# Patient Record
Sex: FEMALE | Race: WHITE
[De-identification: names, ages, dates, MRNs, and addresses within clinical notes are randomized per-mention and may not be internally consistent; named-entity substitution may affect disease eponyms.]

---

## 2018-10-29 ENCOUNTER — HOSPITAL ENCOUNTER (EMERGENCY)
Dept: HOSPITAL 74 - JERFT | Age: 28
Discharge: HOME | End: 2018-10-29
Payer: COMMERCIAL

## 2018-10-29 VITALS — BODY MASS INDEX: 29.1 KG/M2

## 2018-10-29 VITALS — HEART RATE: 71 BPM | DIASTOLIC BLOOD PRESSURE: 67 MMHG | TEMPERATURE: 98.9 F | SYSTOLIC BLOOD PRESSURE: 112 MMHG

## 2018-10-29 DIAGNOSIS — K80.20: ICD-10-CM

## 2018-10-29 LAB
ALBUMIN SERPL-MCNC: 3.7 G/DL (ref 3.4–5)
ALP SERPL-CCNC: 155 U/L (ref 45–117)
ALT SERPL-CCNC: 64 U/L (ref 13–61)
ANION GAP SERPL CALC-SCNC: 9 MMOL/L (ref 8–16)
APPEARANCE UR: CLEAR
AST SERPL-CCNC: 62 U/L (ref 15–37)
BASOPHILS # BLD: 0.9 % (ref 0–2)
BILIRUB SERPL-MCNC: 0.6 MG/DL (ref 0.2–1)
BILIRUB UR STRIP.AUTO-MCNC: NEGATIVE MG/DL
BUN SERPL-MCNC: 8 MG/DL (ref 7–18)
CALCIUM SERPL-MCNC: 8.8 MG/DL (ref 8.5–10.1)
CHLORIDE SERPL-SCNC: 104 MMOL/L (ref 98–107)
CO2 SERPL-SCNC: 28 MMOL/L (ref 21–32)
COLOR UR: (no result)
CREAT SERPL-MCNC: 0.6 MG/DL (ref 0.55–1.3)
DEPRECATED RDW RBC AUTO: 14 % (ref 11.6–15.6)
EOSINOPHIL # BLD: 2.4 % (ref 0–4.5)
EPITH CASTS URNS QL MICRO: (no result) /HPF
GLUCOSE SERPL-MCNC: 74 MG/DL (ref 74–106)
HCT VFR BLD CALC: 39.7 % (ref 32.4–45.2)
HGB BLD-MCNC: 12.7 GM/DL (ref 10.7–15.3)
KETONES UR QL STRIP: NEGATIVE
LEUKOCYTE ESTERASE UR QL STRIP.AUTO: (no result)
LIPASE SERPL-CCNC: 242 U/L (ref 73–393)
LYMPHOCYTES # BLD: 16 % (ref 8–40)
MCH RBC QN AUTO: 29.7 PG (ref 25.7–33.7)
MCHC RBC AUTO-ENTMCNC: 32 G/DL (ref 32–36)
MCV RBC: 92.9 FL (ref 80–96)
MONOCYTES # BLD AUTO: 9.7 % (ref 3.8–10.2)
MUCOUS THREADS URNS QL MICRO: (no result)
NEUTROPHILS # BLD: 71 % (ref 42.8–82.8)
NITRITE UR QL STRIP: NEGATIVE
PH UR: 6 [PH] (ref 5–8)
PLATELET # BLD AUTO: 252 K/MM3 (ref 134–434)
PMV BLD: 9 FL (ref 7.5–11.1)
POTASSIUM SERPLBLD-SCNC: 4.7 MMOL/L (ref 3.5–5.1)
PROT SERPL-MCNC: 8 G/DL (ref 6.4–8.2)
PROT UR QL STRIP: NEGATIVE
PROT UR QL STRIP: NEGATIVE
RBC # BLD AUTO: 4.27 M/MM3 (ref 3.6–5.2)
SODIUM SERPL-SCNC: 141 MMOL/L (ref 136–145)
SP GR UR: 1.02 (ref 1.01–1.03)
UROBILINOGEN UR STRIP-MCNC: NEGATIVE MG/DL (ref 0.2–1)
WBC # BLD AUTO: 8.5 K/MM3 (ref 4–10)

## 2018-10-29 PROCEDURE — 3E033GC INTRODUCTION OF OTHER THERAPEUTIC SUBSTANCE INTO PERIPHERAL VEIN, PERCUTANEOUS APPROACH: ICD-10-PCS

## 2018-10-29 NOTE — PDOC
History of Present Illness





- General


Chief Complaint: Pain, Acute


Stated Complaint: ABD PAIN


Time Seen by Provider: 10/29/18 15:51


History Source: Patient


Exam Limitations: No Limitations





- History of Present Illness


Initial Comments: 





10/29/18 19:24


Patient is a 28-year-old female status post  1 month ago who presents 

to the emergency department for epigastric pain. Patient states that she's had 

the pain for 3 days and his gotten worse. She states that she had similar pains 

while she was pregnant. The pain came and went but was never constant. She 

states that now she is thrown up twice due to the pain. She states that the 

pain feels like a burning sensation. Denies fevers, chills, difficulty breathing

, diarrhea, constipation, frequency and urgency.





Past History





- Travel


Traveled outside of the country in the last 30 days: No


Close contact w/someone who was outside of country & ill: No





- Past Medical History


Allergies/Adverse Reactions: 


 Allergies











Allergy/AdvReac Type Severity Reaction Status Date / Time


 


No Known Allergies Allergy   Verified 10/29/18 15:46











Home Medications: 


Ambulatory Orders





Famotidine [Pepcid -] 20 mg PO DAILY #7 tablet 10/29/18 








COPD: No





- Suicide/Smoking/Psychosocial Hx


Smoking History: Never smoked


Hx Alcohol Use: No


Drug/Substance Use Hx: No





**Review of Systems





- Review of Systems


Able to Perform ROS?: Yes


Comments:: 





10/29/18 16:35


CONSTITUTIONAL: 


Absent: fever, chills, diaphoresis, generalized weakness, malaise, loss of 

appetite


HEENT: 


Absent: rhinorrhea, nasal congestion, throat pain, throat swelling, difficulty 

swallowing, mouth swelling, ear pain, eye pain, visual Changes


CARDIOVASCULAR: 


Absent: chest pain, loss of consciousness, palpitations, irregular heart rate, 

peripheral edema


RESPIRATORY: 


Absent: cough, shortness of breath, dyspnea with exertion, orthopnea, wheezing, 

stridor, hemoptysis


GASTROINTESTINAL:


Present: abdominal pain Absent: abdominal pain, abdominal distension, nausea, 

vomiting, diarrhea, constipation, melena, hematochezia


GENITOURINARY: 


Absent: dysuria, frequency, urgency, hesitancy, hematuria, flank pain, genital 

pain


MUSCULOSKELETAL: 


Absent: myalgia, arthralgia, joint swelling


SKIN: 


Absent: rash, itching, pallor


HEMATOLOGIC/IMMUNOLOGIC: 


Absent: easy bleeding, easy bruising, lymphadenopathy, frequent infections


ENDOCRINE:


Absent: unexplained weight gain, unexplained weight loss, heat intolerance, 

cold intolerance


NEUROLOGIC: 


Absent: headache, focal weakness or paresthesias, dizziness, unsteady gait, 

seizure, mental status changes, bladder or bowel incontinence


PSYCHIATRIC: 


Absent: anxiety, depression, suicidal or homicidal ideation, hallucinations.





Is the patient limited English proficient: No





*Physical Exam





- Vital Signs


 Last Vital Signs











Temp Pulse Resp BP Pulse Ox


 


 98.9 F   71   18   112/67   99 


 


 10/29/18 15:47  10/29/18 15:47  10/29/18 15:47  10/29/18 15:47  10/29/18 15:47














- Physical Exam


Comments: 





10/29/18 16:36


GENERAL:


Well developed, well nourished. Awake and alert. No acute distress.


HEENT:


Normocephalic, atraumatic. PERRLA, EOMI. No conjunctival pallor. Sclera are non-

icteric. Moist mucous membranes. Oropharynx is clear.


NECK: 


Supple. Full ROM. No JVD. Carotid pulses 2+ and symmetric, without bruits. No 

thyromegaly. No lymphadenopathy.


CARDIOVASCULAR:


Regular rate and rhythm. No murmurs, rubs, or gallops. Distal pulses are 2+ and 

symmetric. 


PULMONARY: 


No evidence of respiratory distress. Lungs clear to auscultation bilaterally. 

No wheezing, rales or rhonchi.


ABDOMINAL:


TTP of the epigastric region. No TTP of the RUQ, (-) stevens sign. Soft.  Non-

distended. No rebound or guarding. No organomegaly. Normoactive bowel sounds. 


MUSCULOSKELETAL 


Normal range of motion at all joints. No bony deformities or tenderness. No CVA 

tenderness.


EXTREMITIES: 


No cyanosis. No clubbing. No edema. No calf tenderness.


SKIN: 


Warm and dry. Normal capillary refill. No rashes. No jaundice. 


NEUROLOGICAL: 


Alert, awake, appropriate. Cranial nerves 2-12 intact. No deficits to light 

touch and temperature in face, upper extremities and lower extremities. No 

motor deficits in the in face, upper extremities and lower extremities. 

Normoreflexic in the upper and lower extremities. Normal speech. Toes are down-

going bilaterally. Gait is normal without ataxia.


PSYCHIATRIC: 


Cooperative. Good eye contact. Appropriate mood and affect.








ED Treatment Course





- LABORATORY


CBC & Chemistry Diagram: 


 10/29/18 16:50





 10/29/18 16:50





Medical Decision Making





- Medical Decision Making





10/29/18 19:27


Patient is a 28-year-old female who presented to [facility] for epigastric 

pain and tenderness for 3 days.





Given history of present illness, feels like there is some element of reflux.


GI cocktail ordered


No leukocytosis at this time. Liver enzymes doubled, however total bilirubin is 

0.6.


Ultrasound ordered from [facility]. Shows cholelithiasis without evidence of 

cholecystitis at this time.


Incidental hemangioma of the liver noted.


Patient feels better after GI cocktail. Reports pain has resolved after Reglan, 

Benadryl and Pepcid.


Pain most likely a mixture of gerd and biliary colic.


Patient would like to be discharged home. Will DC home at this time with 

surgical consult. Patient also told to follow up with her primary care doctor.





I discussed the physical exam findings, ancillary test results and final 

diagnoses with the patient. I answered all of the patient's questions. The 

patient was satisfied with the care received and felt comfortable with the 

discharge plan and treatment plan.  The Patient agrees to follow up with the 

primary care physician/specialist within 24-72 hours. Return precautions were 

given.





*DC/Admit/Observation/Transfer


Diagnosis at time of Disposition: 


 Epigastric abdominal pain





Cholelithiasis


Qualifiers:


 Cholelithiasis location: gallbladder Cholecystitis presence: without 

cholecystitis Biliary obstruction: without biliary obstruction Qualified Code(s)

: K80.20 - Calculus of gallbladder without cholecystitis without obstruction








- Discharge Dispostion


Disposition: HOME


Condition at time of disposition: Stable


Decision to Admit order: No





- Prescriptions


Prescriptions: 


Famotidine [Pepcid -] 20 mg PO DAILY #7 tablet





- Referrals


Referrals: 


Chayito Sosa [Primary Care Provider] - 


Sae Smiley MD [Staff Physician] - 





- Patient Instructions


Printed Discharge Instructions:  DI for Gallstones, DI for Gastroesophageal 

Reflux Disease (GERD)


Additional Instructions: 


Bermudez dolor es muy probablemente justice combinacin de reflujo y clculos biliares.


Se observaron clculos biliares en bermudez ultrasonido.


Por favor, soraya un seguimiento con el Dr. Smiley, consulta quirrgica para justice 

evaluacin adicional de bermudez vescula biliar.


Sofy justice dieta blanda que incluye arroz, tostadas y compota de manzana.





Regrese al departamento de emergencias por fiebre, dolor o cualquier sntoma 

nuevo o que empeore.





Your pain is most likely a combination of reflux and gallstones.


There were gallstones seen on your ultrasound


Please follow up with Dr. Smiley, surgical consult for further evaluation of 

your gallbladder.


She a bland diet including plain rice, toast and applesauce.





Return to the emergency department for fevers, pain, or any new or worsening 

symptoms.





- Post Discharge Activity


Forms/Work/School Notes:  Back to Work

## 2018-10-29 NOTE — PDOC
Rapid Medical Evaluation


Chief Complaint: Pain, Acute


Time Seen by Provider: 10/29/18 15:51


Medical Evaluation: 


 Allergies











Allergy/AdvReac Type Severity Reaction Status Date / Time


 


No Known Allergies Allergy   Verified 10/29/18 15:46








 Vital Signs











Temp Pulse Resp BP Pulse Ox


 


 98.9 F   71   18   112/67   99 


 


 10/29/18 15:47  10/29/18 15:47  10/29/18 15:47  10/29/18 15:47  10/29/18 15:47











10/29/18 15:55


I have performed a brief in-person evaluation of this patient.





The patient presents with a chief complaint of: epigastric pain for over a 

month which started during pregnancy and persistent after aa  delivery 

a month ago. report 2 episodes of vomiting yesterday. report she was told 

during pregnancy to decrease acid food intake





Pertinent physical exam findings: mild epigastric pain . no rebound or 

gaurding. normal bowel sounds





I have ordered the following: UA, UCX. abd ultrasound





The patient will proceed to the ED for further evaluation.





 





**Discharge Disposition





- Diagnosis


 Epigastric abdominal pain








- Referrals





- Patient Instructions





- Post Discharge Activity

## 2018-10-30 ENCOUNTER — HOSPITAL ENCOUNTER (INPATIENT)
Dept: HOSPITAL 74 - JER | Age: 28
LOS: 7 days | Discharge: HOME | DRG: 951 | End: 2018-11-06
Attending: INTERNAL MEDICINE | Admitting: INTERNAL MEDICINE
Payer: COMMERCIAL

## 2018-10-30 VITALS — BODY MASS INDEX: 27.3 KG/M2

## 2018-10-30 DIAGNOSIS — K85.90: ICD-10-CM

## 2018-10-30 DIAGNOSIS — R78.81: ICD-10-CM

## 2018-10-30 DIAGNOSIS — D72.829: ICD-10-CM

## 2018-10-30 DIAGNOSIS — R11.2: ICD-10-CM
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
## 2018-10-30 DIAGNOSIS — K85.10: ICD-10-CM

## 2018-10-30 DIAGNOSIS — R10.13: ICD-10-CM

## 2018-10-30 DIAGNOSIS — E87.6: ICD-10-CM

## 2018-10-30 LAB
ALBUMIN SERPL-MCNC: 3.8 G/DL (ref 3.4–5)
ALP SERPL-CCNC: 189 U/L (ref 45–117)
ALT SERPL-CCNC: 69 U/L (ref 13–61)
AMYLASE SERPL-CCNC: > 1300 U/L (ref 25–115)
ANION GAP SERPL CALC-SCNC: 9 MMOL/L (ref 8–16)
APPEARANCE UR: CLEAR
AST SERPL-CCNC: 55 U/L (ref 15–37)
BASOPHILS # BLD: 0.3 % (ref 0–2)
BILIRUB SERPL-MCNC: 1.4 MG/DL (ref 0.2–1)
BILIRUB UR STRIP.AUTO-MCNC: NEGATIVE MG/DL
BUN SERPL-MCNC: 9 MG/DL (ref 7–18)
CALCIUM SERPL-MCNC: 8.8 MG/DL (ref 8.5–10.1)
CHLORIDE SERPL-SCNC: 103 MMOL/L (ref 98–107)
CO2 SERPL-SCNC: 27 MMOL/L (ref 21–32)
COLOR UR: (no result)
CREAT SERPL-MCNC: 0.7 MG/DL (ref 0.55–1.3)
DEPRECATED RDW RBC AUTO: 14.5 % (ref 11.6–15.6)
EOSINOPHIL # BLD: 0.3 % (ref 0–4.5)
EPITH CASTS URNS QL MICRO: (no result) /HPF
GLUCOSE SERPL-MCNC: 116 MG/DL (ref 74–106)
HCT VFR BLD CALC: 41.4 % (ref 32.4–45.2)
HGB BLD-MCNC: 13.2 GM/DL (ref 10.7–15.3)
INR BLD: 1.07 (ref 0.83–1.09)
KETONES UR QL STRIP: NEGATIVE
LEUKOCYTE ESTERASE UR QL STRIP.AUTO: (no result)
LIPASE SERPL-CCNC: (no result) U/L (ref 73–393)
LIPASE SERPL-CCNC: (no result) U/L (ref 73–393)
LYMPHOCYTES # BLD: 5.9 % (ref 8–40)
MCH RBC QN AUTO: 29.7 PG (ref 25.7–33.7)
MCHC RBC AUTO-ENTMCNC: 31.8 G/DL (ref 32–36)
MCV RBC: 93.2 FL (ref 80–96)
MONOCYTES # BLD AUTO: 7.3 % (ref 3.8–10.2)
MUCOUS THREADS URNS QL MICRO: (no result)
NEUTROPHILS # BLD: 86.2 % (ref 42.8–82.8)
NITRITE UR QL STRIP: NEGATIVE
PH UR: 5 [PH] (ref 5–8)
PLATELET # BLD AUTO: 246 K/MM3 (ref 134–434)
PMV BLD: 8.7 FL (ref 7.5–11.1)
POTASSIUM SERPLBLD-SCNC: 3.4 MMOL/L (ref 3.5–5.1)
PROT SERPL-MCNC: 7.8 G/DL (ref 6.4–8.2)
PROT UR QL STRIP: NEGATIVE
PROT UR QL STRIP: NEGATIVE
PT PNL PPP: 12.6 SEC (ref 9.7–13)
RBC # BLD AUTO: 4.44 M/MM3 (ref 3.6–5.2)
SODIUM SERPL-SCNC: 139 MMOL/L (ref 136–145)
SP GR UR: 1.01 (ref 1.01–1.03)
UROBILINOGEN UR STRIP-MCNC: (no result) MG/DL (ref 0.2–1)
WBC # BLD AUTO: 17.2 K/MM3 (ref 4–10)

## 2018-10-30 RX ADMIN — POTASSIUM CHLORIDE SCH MLS/HR: 7.46 INJECTION, SOLUTION INTRAVENOUS at 22:27

## 2018-10-30 RX ADMIN — SODIUM CHLORIDE, POTASSIUM CHLORIDE, SODIUM LACTATE AND CALCIUM CHLORIDE SCH MLS/HR: 600; 310; 30; 20 INJECTION, SOLUTION INTRAVENOUS at 19:54

## 2018-10-30 RX ADMIN — AMPICILLIN SODIUM AND SULBACTAM SODIUM SCH MLS/HR: 2; 1 INJECTION, POWDER, FOR SOLUTION INTRAMUSCULAR; INTRAVENOUS at 21:05

## 2018-10-30 NOTE — PN
Teaching Attending Note


Name of Resident: Ashley Cash





ATTENDING PHYSICIAN STATEMENT





I saw and evaluated the patient.


I reviewed the resident's note and discussed the case with the resident.


I agree with the resident's findings and plan as documented.








SUBJECTIVE:


Seen and examined; she is a 27 y/o HF presenting to the ER for the second day 

in a row for upper abdominal pain.  Please see the resident's note for further 

historical information.  In summation, she has no PMH and is  and takes no 

chronic medications.  She is hemodynamically stable and afebrile but does have 

an elevated WBC count.  She came back today because the pain was worse.  US GB 

today shows sludge in the GB with CBD 5cm and recommended MRCP; the patient's 

AST/ALT, Alk Phos, and Total Bili are elevated today which is new from 

yesterday.  Lipase >30k.  Admitting to the medicine service for acute 

pancreatitis likely due to biliary etiology and consulting GI and Surgery





10 sys ROS done and negative aside from HPI





Denies EtOH, tobacco abuse.  From [place].


FH asked and noncontributory


PMH and PSH per chart





OBJECTIVE:


VSS and imaging reviewed


NAD, resting in bed, AAO


Tender in upper abd; ND, +BS


RRR S1/2 no mgr


Lungs CTAB with sym exp


Trachea midline without any JVD


NC AT EOMI PERRLA


Mood normal, behavior appropriate


CN 2-12 wnl, no FND





ASSESSMENT AND PLAN:


1) Acute Pancreatitis


-Patient presents with elevated LFTs and Lipase >30k; suspicious for acute 

pancreatitis from biliary source.  US results noted; suspecting biliary etiology


-Consulting GI and Surgery


-MRCP ordered for AM


-Trend CMP, monitor clinically.  If she decompensates tonight can check CT but 

likely can rely on MRCP for imaging.  Check TB as from endemic area, check 

lipids.  Monitor blood cultures.


-As elevated WBC will monitor fevers and empirically cover with abx


-LR @150cc/hr; monitor Is and Os.





2) Transaminitis





Full Code

## 2018-10-30 NOTE — CONSULT
Consult


Consult Specialty:: General Surgery


Referred by:: Madina Horne


Reason for Consultation:: gallstone pancreatitis





- History of Present Illness


Chief Complaint: epigastric pain, n/v


History of Present Illness: 





29yo [place] F, 1 month postpartum from uncomplicated  1 week past 

due date, presented to ER with epigastric pain radiating to back associated 

with multiple episodes of N/V. She was seen in ER yesterday for similar symptoms

, but today was much worse, both in pain and vomiting. She also experienced 

similar symptoms about 5 months ago during pregnancy. Yesterday, labs were 

normal except for mildly elevated LFTs with normal bili, and US showed 

gallstones with distended gallbladder and enlarged cbd at 6mm. She felt better 

after GI cocktail and reglan, and wanted to go home; she was discharged with 

reglan prn, which she only took one of at home. Today, she had worse pain and 

more vomiting after eating and returned to ER. Labs now significant for wbc 17, 

bili 1.4, LFTs still double normal, lipase >30,000. US repeated showing similar 

findings, stones and sludge in gallbladder, cbd 5mm. Surgery is asked to assess.





She is seen and examined in ER holding area,  at bedside. He speaks 

English and assisted with history at her request. She indicated her pain is 

better but still present and points to epigastric area. She is breastfeeding 

her son, but understands she will have to pump and dump for now. IV fluids and 

antibiotics have been started, at GI's request, who will see her as well. Last 

normal BM was yesterday. Pregnancy and c/s delivery were uncomplicated. She is 

only taking prenatal vitamins at home. 





- History Source


History Provided By: Family Member (), Medical Record


Limitations to Obtaining History: Language Barrier ([place] -  

facilitated history and translation at bedside at pt's request)





- Past Medical History


Hepatobiliary: Yes: Cholelithiasis


...Pregnant: No (1 month postpartum)





- Past Surgical History


Past Surgical History: Yes:  (18)





- Alcohol/Substance Use


Hx Alcohol Use: No


History of Substance Use: reports: None





- Smoking History


Smoking history: Never smoked





- Social History


Usual Living Arrangement: With Spouse


ADL: Independent


Occupation: not employed


Place of Birth: [place] ([place]


Came to [place] (year[place] 


History of Recent Travel: No





Home Medications





- Allergies


Allergies/Adverse Reactions: 


 Allergies











Allergy/AdvReac Type Severity Reaction Status Date / Time


 


No Known Allergies Allergy   Verified 10/30/18 16:22














- Home Medications


Home Medications: 


Ambulatory Orders





Metoclopramide HCl [Reglan] 10 mg PO TID 10/30/18 


Pnv No.95/Ferrous Fum/Folic AC [Prenatal Multivitamin Tablet] 1 each PO DAILY 10

/30/18 











Family Disease History





- Family Disease History


Family Disease History: Diabetes: Father


Other Family History: cousin had gallbladder out





Review of Systems





- Review of Systems


Constitutional: denies: Chills, Fever


Eyes: reports: Other (reading glasses).  denies: Recent Change in Vision


HENT: denies: Difficult Swallowing, Throat Pain


Neck: denies: Swollen Glands, Tenderness


Cardiovascular: denies: Chest Pain, Palpitations


Respiratory: denies: Cough, SOB


Gastrointestinal: reports: Abdominal Pain (with hpi), Nausea (with hpi), 

Vomiting (with hpi).  denies: Constipation, Diarrhea


Genitourinary: denies: Burning, Dysuria


Musculoskeletal: reports: Back Pain (with hpi (through from epigastrium)).  

denies: Joint Pain, Muscle Pain


Integumentary: denies: Change in Color, Rash


Neurological: denies: Dizziness, Headache


Psychiatric: denies: Anxiety, Depression





Physical Exam


Vital Signs: 


 Vital Signs











Temperature  97.2 F L  10/30/18 16:22


 


Pulse Rate  97 H  10/30/18 16:22


 


Respiratory Rate  18   10/30/18 16:22


 


Blood Pressure  116/81   10/30/18 16:22


 


O2 Sat by Pulse Oximetry (%)  100   10/30/18 16:22











Constitutional: Yes: Well Nourished, No Distress, Calm


Eyes: Yes: Conjunctiva Clear, EOM Intact.  No: Sclera Icterus


HENT: Yes: Atraumatic, Normocephalic


Neck: Yes: Supple, Trachea Midline


Cardiovascular: Yes: Regular Rate and Rhythm


Respiratory: Yes: Regular, CTA Bilaterally


Gastrointestinal: Yes: Soft, Hypoactive Bowel Sounds, Tenderness (epigastric 

and RUQ), Tenderness, Epigastrium.  No: Distention, Tenderness, Rebound


...Rectal Exam: Yes: Deferred


Renal/: No: CVA Tenderness - Left, CVA Tenderness - Right


Musculoskeletal: No: Joint Stiffness, Joint Swelling


Extremities: No: Cool, Cyanosis


Edema: No


Peripheral Pulses WNL: Yes


Integumentary: Yes: Incision (Pfannenstiel, healed).  No: Jaundice, Rash


Wound/Incision: Yes: Clean/Dry, Well Approximated, Open to air


Neurological: Yes: Alert, Oriented


Psychiatric: Yes: Alert, Oriented


Labs: 


 CBC, BMP





 10/30/18 16:53 





 10/30/18 16:53 





 CMP











Sodium  139 mmol/L (136-145)   10/30/18  16:53    


 


Potassium  3.4 mmol/L (3.5-5.1)  L  10/30/18  16:53    


 


Chloride  103 mmol/L ()   10/30/18  16:53    


 


Carbon Dioxide  27 mmol/L (21-32)   10/30/18  16:53    


 


Anion Gap  9 MMOL/L (8-16)   10/30/18  16:53    


 


BUN  9 mg/dL (7-18)   10/30/18  16:53    


 


Creatinine  0.7 mg/dL (0.55-1.3)   10/30/18  16:53    


 


Creat Clearance w eGFR  > 60  (>60)   10/30/18  16:53    


 


Random Glucose  116 mg/dL ()  H  10/30/18  16:53    


 


Calcium  8.8 mg/dL (8.5-10.1)   10/30/18  16:53    


 


Total Bilirubin  1.4 mg/dL (0.2-1)  H  10/30/18  16:53    


 


AST  55 U/L (15-37)  H  10/30/18  16:53    


 


ALT  69 U/L (13-61)  H  10/30/18  16:53    


 


Alkaline Phosphatase  189 U/L ()  H  10/30/18  16:53    


 


Total Protein  7.8 g/dl (6.4-8.2)   10/30/18  16:53    


 


Albumin  3.8 g/dl (3.4-5.0)   10/30/18  16:53    


 


Lipase  > 99126 U/L ()  H  10/30/18  16:44    


 


Beta HCG, Quant  < 1.0 mIU/ml  10/30/18  16:44    








wbc up from 8.5 yesterday


bili up from 0.6 yesterday


LFTs otherwise about the same


lipase 242 yesterday


 INR, PTT











INR  1.07  (0.83-1.09)   10/30/18  16:53    








 Urine Test Results











Urine Color  Dkyellow   10/30/18  16:53    


 


Urine Appearance  Clear   10/30/18  16:53    


 


Urine pH  5.0  (5.0-8.0)   10/30/18  16:53    


 


Ur Specific Gravity  1.011  (1.010-1.035)   10/30/18  16:53    


 


Urine Protein  Negative  (NEGATIVE)   10/30/18  16:53    


 


Urine Glucose (UA)  Negative  (NEGATIVE)   10/30/18  16:53    


 


Urine Ketones  Negative  (NEGATIVE)   10/30/18  16:53    


 


Urine Blood  2+  (NEGATIVE)  H  10/30/18  16:53    


 


Urine Nitrite  Negative  (NEGATIVE)   10/30/18  16:53    


 


Urine Bilirubin  Negative  (<2.0 mg/dL)   10/30/18  16:53    


 


Ur Leukocyte Esterase  Trace  (NEGATIVE)   10/30/18  16:53    


 


Ur Epithelial Cells  Rare /HPF (FEW)   10/30/18  16:53    


 


Urine Mucus  Rare   10/30/18  16:53    














Imaging





- Results


Ultrasound: Report Reviewed, Image Reviewed (images reviewed from yesterday and 

today - multiple small gallstones, some sludge, distended gb, dilated cbd/chd 

at 5-6mm (pt age 28), no wall thickening or pericholecystic fluid)


MRI: Pending





Problem List





- Problems


(1) Acute biliary pancreatitis without infection or necrosis


Assessment/Plan: 


admitted to medicine


NPO until pain/tenderness resolve


generous IV hydration


MRCP pending


GI consultation for possible ERCP


trend labs including amylase, lipase


GI requested IV antibiotics, Unasyn and Flagyl


pain meds prn - nonnarcotics first line, morphine breakthrough only





discussed possible cholecystectomy with pt and , but timing and approach 

will have to be considered further pending above and given recent open surgery 

with healing scar





discussed with Dr. Whalen of medical team


Code(s): K85.10 - BILIARY ACUTE PANCREATITIS WITHOUT NECROSIS OR INFECTION   





(2) Calculus of gallbladder and bile duct w/o cholecystitis or obstruction


Code(s): K80.70 - CALCULUS OF GB AND BILE DUCT W/O CHOLECYST W/O OBSTRUCTION   





(3) Disease of digestive system complicating puerperium


Assessment/Plan: 


1 month postpartum


breastfeeding - will need to pump & dump until 48 hours after last 

contraindicated medication intake





Code(s): O99.63 - DISEASES OF THE DIGESTIVE SYSTEM COMPLICATING THE PUERPERIUM 

  





(4) Epigastric abdominal pain


Code(s): R10.13 - EPIGASTRIC PAIN   





(5) Nausea and vomiting


Code(s): R11.2 - NAUSEA WITH VOMITING, UNSPECIFIED   


Qualifiers: 


   Vomiting type: unspecified   Vomiting Intractability: non-intractable   

Qualified Code(s): R11.2 - Nausea with vomiting, unspecified

## 2018-10-30 NOTE — HP
CHIEF COMPLAINT: abdominal pain with vomiting 





PCP: does not have one 








HISTORY OF PRESENT ILLNESS:


28 year old female from [place] with no PMHx presented to the 

hospital with 2 days history of med abdominal pain 10/10 radiating to her back, 

worsen with food improved with pain killer in ED , associated with vomiting x4 

of food particles. she presented to [facility] Ed yesterday with the same complaint 

and was sent home with metochlopramid and Ranitine prescription. pt denies any 

headache, fever, chills, N/D/C, denies any chest pain , palpitation or sob , 

she denies any urinary symptoms, joint pain or swelling in her feet. 





Pt gave a birth last month with C section , she had similiar symptoms during 

pregancy . 











ER course was notable for:


(1)Cbc, CMP 


(2)Abdomen US 


(3)Morphine , IV fluids 





Recent Travel:[place] , came from [place] 10 months ago.





PAST MEDICAL HISTORY:denies 





PAST SURGICAL HISTORY:


Skin lumb removal on left flank , C section X 1 


Social History:


Smoking:denies 


Alcohol:denies 


Drugs: denies 





Family History:Father with DM 


Allergies





No Known Allergies Allergy (Verified 10/30/18 16:22)


 








HOME MEDICATIONS:


 Home Medications











 Medication  Instructions  Recorded


 


Metoclopramide HCl [Reglan] 10 mg PO TID 10/30/18








REVIEW OF SYSTEMS


CONSTITUTIONAL: 


Absent:  fever, chills, diaphoresis, generalized weakness, malaise, loss of 

appetite, weight change


HEENT: 


Absent:  rhinorrhea, nasal congestion, throat pain, throat swelling, difficulty 

swallowing, mouth swelling, ear pain, eye pain, visual changes


CARDIOVASCULAR: 


Absent: chest pain, syncope, palpitations, irregular heart rate, lightheadedness

, peripheral edema


RESPIRATORY: 


Absent: cough, shortness of breath, dyspnea with exertion, orthopnea, wheezing, 

stridor, hemoptysis


GASTROINTESTINAL:


Absent: abdominal pain, abdominal distension, nausea, vomiting, diarrhea, 

constipation, melena, hematochezia


GENITOURINARY: 


Absent: dysuria, frequency, urgency, hesitancy, hematuria, flank pain, genital 

pain


MUSCULOSKELETAL: 


Absent: myalgia, arthralgia, joint swelling, back pain, neck pain


SKIN: 


Absent: rash, itching, pallor


HEMATOLOGIC/IMMUNOLOGIC: 


Absent: easy bleeding, easy bruising, lymphadenopathy, frequent infections


ENDOCRINE:


Absent: unexplained weight gain, unexplained weight loss, heat intolerance, 

cold intolerance


NEUROLOGIC: 


Absent: headache, focal weakness or paresthesias, dizziness, unsteady gait, 

seizure, mental status changes, bladder or bowel incontinence


PSYCHIATRIC: 


Absent: anxiety, depression, suicidal or homicidal ideation, hallucinations.








PHYSICAL EXAMINATION


 Vital Signs - 24 hr











  10/30/18





  16:22


 


Temperature 97.2 F L


 


Pulse Rate 97 H


 


Respiratory 18





Rate 


 


Blood Pressure 116/81


 


O2 Sat by Pulse 100





Oximetry (%) 








GENERAL: AAOx3 in moderate distress 


HEAD: NC/AT


EYES: EOMI, Conjunctiva clear, sclera anicteric


ENT: moist mucous membrane 


NECK: Supple, no JVD


LUNGS: CTA B/L, no crackles no wheezing no accessory muscle use.


HEART: RRR, NSR, normal s1, s2, murmur no M/R/G


ABDOMEN: Soft, ND, mid epigastric and med abdominal tenderness , +BS 4 Q, left  

CVA Tenderness.Grant + 


LOWER EXTREMITIES: no edema, +2DP pulse,


NEUROLOGICAL:  No focal deficit. Normal speech. gait not observed.


PSYCHIATRIC: Cooperative. Good eye contact. Appropriate mood and affect.


SKIN: Warm, dry,





 Laboratory Results - last 24 hr











  10/30/18 10/30/18 10/30/18





  16:44 16:53 16:53


 


WBC   17.2 H 


 


RBC   4.44 


 


Hgb   13.2 


 


Hct   41.4 


 


MCV   93.2 


 


MCH   29.7 


 


MCHC   31.8 L 


 


RDW   14.5 


 


Plt Count   246 


 


MPV   8.7 


 


Absolute Neuts (auto)   14.8 H 


 


Neutrophils %   86.2 H D 


 


Lymphocytes %   5.9 L D 


 


Monocytes %   7.3 


 


Eosinophils %   0.3  D 


 


Basophils %   0.3 


 


Nucleated RBC %   0 


 


PT with INR    12.60


 


INR    1.07


 


Sodium   


 


Potassium   


 


Chloride   


 


Carbon Dioxide   


 


Anion Gap   


 


BUN   


 


Creatinine   


 


Creat Clearance w eGFR   


 


Random Glucose   


 


Calcium   


 


Total Bilirubin   


 


AST   


 


ALT   


 


Alkaline Phosphatase   


 


Total Protein   


 


Albumin   


 


Lipase  > 26223 H  


 


Beta HCG, Quant  < 1.0  


 


Urine Color   


 


Urine Appearance   


 


Urine pH   


 


Ur Specific Gravity   


 


Urine Protein   


 


Urine Glucose (UA)   


 


Urine Ketones   


 


Urine Blood   


 


Urine Nitrite   


 


Urine Bilirubin   


 


Urine Urobilinogen   


 


Ur Leukocyte Esterase   


 


Urine WBC (Auto)   


 


Urine RBC (Auto)   


 


Ur Epithelial Cells   


 


Urine Mucus   


 


Urine HCG, Qual   














  10/30/18 10/30/18





  16:53 16:53


 


WBC  


 


RBC  


 


Hgb  


 


Hct  


 


MCV  


 


MCH  


 


MCHC  


 


RDW  


 


Plt Count  


 


MPV  


 


Absolute Neuts (auto)  


 


Neutrophils %  


 


Lymphocytes %  


 


Monocytes %  


 


Eosinophils %  


 


Basophils %  


 


Nucleated RBC %  


 


PT with INR  


 


INR  


 


Sodium   139


 


Potassium   3.4 L


 


Chloride   103


 


Carbon Dioxide   27


 


Anion Gap   9


 


BUN   9


 


Creatinine   0.7


 


Creat Clearance w eGFR   > 60


 


Random Glucose   116 H


 


Calcium   8.8


 


Total Bilirubin   1.4 H


 


AST   55 H


 


ALT   69 H


 


Alkaline Phosphatase   189 H


 


Total Protein   7.8


 


Albumin   3.8


 


Lipase  


 


Beta HCG, Quant  


 


Urine Color  Dkyellow 


 


Urine Appearance  Clear 


 


Urine pH  5.0 


 


Ur Specific [place]  1.011 


 


Urine Protein  Negative 


 


Urine Glucose (UA)  Negative 


 


Urine Ketones  Negative 


 


Urine Blood  2+ H 


 


Urine Nitrite  Negative 


 


Urine Bilirubin  Negative 


 


Urine Urobilinogen  4.0 e.u/dl H 


 


Ur Leukocyte Esterase  Trace 


 


Urine WBC (Auto)  4 


 


Urine RBC (Auto)  14 


 


Ur Epithelial Cells  Rare 


 


Urine Mucus  Rare 


 


Urine HCG, Qual  Positive 








 CBC, BMP





 10/30/18 16:53 





 10/30/18 16:53 








ASSESSMENT/PLAN:


28 year old female azalea speaking returned  to the ED with 2 days  history of 

abdominal pain and vomiting was found to have pancreatitis gallstones will be 

admitted to med-surg for further evaluation 





# Acute abdominal pain due to pancreatitis gall stones 


* Abdominal pain 7-9/10 , with 4 times vomiting 


* Lipase 30, 000, WBC 17.000, Bili 1.4 total , AST 55, ALT 69


* NPO 


* IV fluids 


* pain control with morphine Q 4 hr 


* GI consult for possible ERCP 


* PT, PTT , INR 


* cbc , cmp in AM , Mg , P 


* Bilirubin total and direct 


* Amylase and lipase in AM 


*  abx 


* Blood cx ,UA  urine cx, cxR


# FEN 


* F: NS @ 125 CC/hr 


* E: hypokalemia , replinished as needed , repeat BMP in AM 


* N: NPO till ERCP 


# Proph : pneumatic compression 





# Dispo: admit to med surg 


# Full code 








Visit type





- Emergency Visit


Emergency Visit: Yes


ED Registration Date: 10/30/18


Care time: The patient presented to the Emergency Department on the above date 

and was hospitalized for further evaluation of their emergent condition.





- New Patient


This patient is new to me today: Yes


Date on this admission: 10/30/18





- Critical Care


Critical Care patient: No

## 2018-10-30 NOTE — PDOC
History of Present Illness





- General


Chief Complaint: Pain


Stated Complaint: ABD PAIN


Time Seen by Provider: 10/30/18 16:23





- History of Present Illness


Initial Comments: 


Flo Lay is a 27yo woman 1 mo postpartum (uncomplicated c-

section) who was seen in the ED yesterday with epigastric pain. she re-presents 

today with worsening pain. 





Yesterday, she reported burning epigastric pain that would come and go, present 

for 3 days, 2 episodes of emesis. She had similar pain when pregnant. She had 

an ultrasound showing gallstones and gallbladder distension but no wall 

thickening or pericholecystic fluid. Her LFTs were very mildly elevated, and 

remainder of her labs were not concerning. She felt better after receiving a GI 

cocktail and was discharged home with suspected GERD and biliary colic.





Today, Ms Whitmore was initially feeling well in the morning. She had cereal 

for breakfast without any issues. After rice at lunch, she started to have "11/

10" non-radiating epigastric pain and several episodes of emesis. She took some 

of the reglan prescribed yesterday without relief. She has had 4 episodes of 

vomiting total, and her pain has continued to be severe. She is unable to 

describe the pain, saying that "it just hurts." She has not had any fevers over 

the past day. 


10/30/18 17:44








Past History





- Past Medical History


Allergies/Adverse Reactions: 


 Allergies











Allergy/AdvReac Type Severity Reaction Status Date / Time


 


No Known Allergies Allergy   Verified 10/30/18 16:22











Home Medications: 


Ambulatory Orders





Metoclopramide HCl [Reglan] 10 mg PO TID 10/30/18 








COPD: No





- Immunization History


Immunization Up to Date: Yes





- Suicide/Smoking/Psychosocial Hx


Smoking History: Never smoked


Hx Alcohol Use: No


Drug/Substance Use Hx: No





**Review of Systems





- Review of Systems


Comments:: 


General: No fevers, no chills, no weight or appetite change, no malaise


HEENT: No changes in vision, no changes in hearing, no congestion, no sore 

throat


CV: No chest pain, no palpitations, no LE edema


Pulm: No SOB, no cough, no wheezing


GI: See HPI. No change in bowel habits


: No frequency, no urgency, no dysuria


Musc: No back pain, no joint swelling, no recent injury


Skin: No rash, no lesions, no erythema


Endo: No excessive thirst, no heat/cold intolerance


Heme: No unusual bruising or bleeding, no swollen glands


Neuro: No syncope, no numbness/tingling, no focal weakness


Vasc: No claudication


Psych: No recent change in mood, no SI or HI








*Physical Exam





- Vital Signs


 Last Vital Signs











Temp Pulse Resp BP Pulse Ox


 


 97.2 F L  97 H  18   116/81   100 


 


 10/30/18 16:22  10/30/18 16:22  10/30/18 16:22  10/30/18 16:22  10/30/18 16:22














- Physical Exam


Comments: 


General: Uncomfortable, in no acute distress


HEENT: PERRL, EOMI, MMM, voice normal, normal neck ROM


Cards: RRR, no murmur appreciated


Pulm: Comfortable on room air, clear to auscultation bilaterally


Abd: Soft, nondistended. Moderate epigastric tenderness. Negative Grant's 

sign. 


: No CVA tenderness


Ext: Atraumatic. No LE edema. ROM intact. Strength 5/5 and equal bilaterally


Vasc: Extremities WWP. 


Skin: Normal color, no rashes or lesions


Neuro: A&Ox3, CN grossly intact, normal speech, motor/sensory grossly intact 

and symmetric


Psych: Mood appropriate to situation











ED Treatment Course





- LABORATORY


CBC & Chemistry Diagram: 


 10/30/18 16:53





 10/30/18 16:53





- ADDITIONAL ORDERS


Additional order review: 


 Laboratory  Results











  10/30/18





  16:53


 


Urine HCG, Qual  Positive








 











  10/30/18





  16:53


 


RBC  4.44


 


MCV  93.2


 


MCHC  31.8 L


 


RDW  14.5


 


MPV  8.7


 


Neutrophils %  86.2 H D


 


Lymphocytes %  5.9 L D


 


Monocytes %  7.3


 


Eosinophils %  0.3  D


 


Basophils %  0.3














- Medications


Given in the ED: 


ED Medications














Discontinued Medications














Generic Name Dose Route Start Last Admin





  Trade Name Freq  PRN Reason Stop Dose Admin


 


Acetaminophen  1,000 mg  10/30/18 16:26  10/30/18 17:06





  Ofirmev Injection -  IVPB  10/30/18 16:27  1,000 mg





  ONCE ONE   Administration





     





     





     





     


 


Famotidine/Sodium Chloride  20 mg in 50 mls @ 100 mls/hr  10/30/18 16:27  10/30/

18 17:06





  Pepcid 20 Mg Premixed Ivpb -  IVPB  10/30/18 16:56  100 mls/hr





  ONCE ONE   Administration





     





     





     





     


 


Ondansetron HCl  4 mg  10/30/18 16:27  10/30/18 17:06





  Zofran Injection  IVPUSH  10/30/18 16:28  4 mg





  ONCE ONE   Administration





     





     





     





     














Medical Decision Making





- Medical Decision Making





10/30/18 17:45


Flo Lay is an otherwise healthy 27yo woman 1 month post c-

section who presents to the ED for worsening epigastric pain and emesis. She 

was seen in the ED yesterday and found to have gallstones without 

cholelithiasis and suspected GERD. 


- Given worsening of symptoms, concern for cholecystitis


- CBC, CMP, lipase, UA, urine preg, repeat US ordered in E


- Urine preg positive, was negative yesterday - bHCG added


- WBC returned at 17 from 8 yesterday. Remainder of labs pending





10/30/18 17:58


- Labs notable for WBC 17.2 from 8.5, tbili 1.4 from 0.6, lipase >07674 from 

242. Urine with 2+ blood, urobilinogen 4.0 from negative, 4 wbc, 14 rbc's. 


- Patient at ultrasound


- Suspect gallstone pancreatitis


- Need to verify PMD. Will admit with GI and surgery consults





10/30/18 19:06


- Admit to hospitalist service. Waiting for call back for sign out


- Spoke to Dr Soni. Requested LR@150/hr, blood cultures, unasyn and flagyl, 

MRCP order. Per request also placed orders for AM LFTs, direct bili, CRP, 

amylase and lipase for 6am. 





10/30/18 19:28


- Spoke to Dr Odom. Sending repeat amylase and lipase now. Requests NPO now, IV 

tylenol for pain rather than morphine.


- Microblog sent to medicine team with recommendation updates from Dr Soni 

and Dr Odom.








Seen and discussed with Dr Guardado.





Madina Horne


PGY1











*DC/Admit/Observation/Transfer


Diagnosis at time of Disposition: 


 Epigastric abdominal pain








- Referrals





- Patient Instructions





- Post Discharge Activity

## 2018-10-30 NOTE — PDOC
Rapid Medical Evaluation


Chief Complaint: Pain


Time Seen by Provider: 10/30/18 16:23


Medical Evaluation: 


 Allergies











Allergy/AdvReac Type Severity Reaction Status Date / Time


 


No Known Allergies Allergy   Verified 10/30/18 16:22











10/30/18 16:23


Pt presents to the ED for epigastric pain and tenderness. Was evaluated 

yesterday and found to have gall stones, but no cholecysitis. Worsening pain 

today. States the pain came when eating


Exam: pt appears uncomfortable. epigastric tenderness on exam


Orders: Labs, urine, IV, US


Pt to proceed to ED for further evaluation





**Discharge Disposition





- Diagnosis


 Epigastric abdominal pain








- Referrals





- Patient Instructions





- Post Discharge Activity

## 2018-10-30 NOTE — PDOC
Attending Attestation





- HPI


HPI: 





10/30/18 17:46


The patient is a 28-year-old female, with no past medical history, s/p 1 month 

postpartum, who presents to the ED with upper abdominal pain. The patient was 

seen in the ED yesterday and had an US that revealed gallstones. Patient was 

discharged with the diagnosis of GERD. Patient developed the pain today after 

eating rice. 





The patient denies any fevers, chills, nausea, vomiting, or diarrhea. 





Allergies: NKA








- Physicial Exam


PE: 


10/30/18 17:47


GENERAL: Awake, alert, and fully oriented, in no acute distress


HEAD: No signs of trauma


EYES: PERRLA, EOMI, sclera anicteric, conjunctiva clear


ENT: Auricles normal inspection, hearing grossly normal, nares patent, 

oropharynx clear without exudates. Moist mucosa


NECK: Normal ROM, supple, no lymphadenopathy, JVD, or masses


LUNGS: Breath sounds equal, clear to auscultation bilaterally.  No wheezes, and 

no crackles


HEART: Regular rate and rhythm, normal S1 and S2, no murmurs, rubs or gallops


ABDOMEN: (+)Tenderness to the epigastric region, mild tenderness to palpation 

of the RUQ. Grant's sign negative. Soft, normoactive bowel sounds.  No guarding

, no rebound.  No masses


EXTREMITIES: Normal range of motion, no edema.  No clubbing or cyanosis. No 

cords, erythema, or tenderness


NEUROLOGICAL: Cranial nerves II through XII grossly intact.  Normal speech, 

normal gait


SKIN: Warm, Dry, normal turgor, no rashes or lesions noted.








<Pat Carranza - Last Filed: 10/30/18 17:49>





- Resident


Resident Name: Madina Horne





- ED Attending Attestation


I have performed the following: I have examined & evaluated the patient, The 

case was reviewed & discussed with the resident, I agree w/resident's findings 

& plan, Exceptions are as noted





- Medical Decision Making





10/30/18 17:35





A portion of this note was documented by scribe services under my direction. I 

have reviewed the details of the note, within reason, and agree with the 

documentation with the following case summary and management plan written by 

me. 





Patient treated in the ED.





Nursing notes are reviewed and incorporated into the medical decision-making.


Vital signs reviewed.





Peripheral IV access obtained by the nurse, laboratory studies are drawn and 

sent, reviewed and interpreted by myself. 





 Vital Signs











Temp Pulse Resp BP Pulse Ox


 


 97.2 F L  97 H  18   116/81   100 


 


 10/30/18 16:22  10/30/18 16:22  10/30/18 16:22  10/30/18 16:22  10/30/18 16:22








28-year-old female patient with no past medical history, status post 1 month 

postpartum via  presents with persistent upper abdominal pain. Patient 

was seen here yesterday had ultrasound performed which demonstrated gallstones. 

White count was unremarkable the patient was sent home with diagnoses of GERD. 

Today, the patient ate rice and had recurrence of pain. No fevers or chills.





Patient has upper abdominal pain. I'm concerned for acute cholecystitis. White 

count is 17. We'll obtain a repeat ultrasound and if equivocal, we'll admit for 

MRCP. Will likely need empiric antibiotics and surgical consult and admission 

to the hospital.


10/30/18 17:55





 CBC, BMP





 10/30/18 16:53 





 10/30/18 16:53 





 CMP











Sodium  139 mmol/L (136-145)   10/30/18  16:53    


 


Potassium  3.4 mmol/L (3.5-5.1)  L  10/30/18  16:53    


 


Chloride  103 mmol/L ()   10/30/18  16:53    


 


Carbon Dioxide  27 mmol/L (21-32)   10/30/18  16:53    


 


Anion Gap  9 MMOL/L (8-16)   10/30/18  16:53    


 


BUN  9 mg/dL (7-18)   10/30/18  16:53    


 


Creatinine  0.7 mg/dL (0.55-1.3)   10/30/18  16:53    


 


Creat Clearance w eGFR  > 60  (>60)   10/30/18  16:53    


 


Random Glucose  116 mg/dL ()  H  10/30/18  16:53    


 


Calcium  8.8 mg/dL (8.5-10.1)   10/30/18  16:53    


 


Total Bilirubin  1.4 mg/dL (0.2-1)  H  10/30/18  16:53    


 


AST  55 U/L (15-37)  H  10/30/18  16:53    


 


ALT  69 U/L (13-61)  H  10/30/18  16:53    


 


Alkaline Phosphatase  189 U/L ()  H  10/30/18  16:53    


 


Total Protein  7.8 g/dl (6.4-8.2)   10/30/18  16:53    


 


Albumin  3.8 g/dl (3.4-5.0)   10/30/18  16:53    


 


Lipase  > 55525 U/L ()  H  10/30/18  16:44    


 


Beta HCG, Quant  < 1.0 mIU/ml  10/30/18  16:44    








Lipase > 73109.


I'm concerned for gallstone pancreatitis.


GI and surgery consult.


Admit.





<Stephen Guardado - Last Filed: 10/30/18 18:00>





**Heart Score/ECG Review


  ** #1


ECG reviewed & interpreted by me at: 17:35





10/30/18 17:40


NSR 75, no std/juan, T wave flat III, normal axis, normal intervals,  msec





<Stephen Guardado - Last Filed: 10/30/18 18:00>





Attestations





- Attestations





10/30/18 17:49





Documentation prepared by Pat Carranza, acting as medical scribe for Stephen Guardado MD.





<Pat Carranza - Last Filed: 10/30/18 17:49>

## 2018-10-31 LAB
ALBUMIN SERPL-MCNC: 3.3 G/DL (ref 3.4–5)
ALP SERPL-CCNC: 145 U/L (ref 45–117)
ALT SERPL-CCNC: 48 U/L (ref 13–61)
AMYLASE SERPL-CCNC: > 1300 U/L (ref 25–115)
ANION GAP SERPL CALC-SCNC: 8 MMOL/L (ref 8–16)
APTT BLD: 32.4 SECONDS (ref 25.2–36.5)
AST SERPL-CCNC: 27 U/L (ref 15–37)
BASOPHILS # BLD: 0.1 % (ref 0–2)
BILIRUB CONJ SERPL-MCNC: 0.2 MG/DL (ref 0–0.2)
BILIRUB SERPL-MCNC: 0.6 MG/DL (ref 0.2–1)
BUN SERPL-MCNC: 6 MG/DL (ref 7–18)
CALCIUM SERPL-MCNC: 8.7 MG/DL (ref 8.5–10.1)
CHLORIDE SERPL-SCNC: 104 MMOL/L (ref 98–107)
CHOLEST SERPL-MCNC: 151 MG/DL (ref 50–200)
CO2 SERPL-SCNC: 25 MMOL/L (ref 21–32)
CREAT SERPL-MCNC: 0.6 MG/DL (ref 0.55–1.3)
DEPRECATED RDW RBC AUTO: 14.1 % (ref 11.6–15.6)
EOSINOPHIL # BLD: 0.1 % (ref 0–4.5)
GLUCOSE SERPL-MCNC: 85 MG/DL (ref 74–106)
HCT VFR BLD CALC: 38.5 % (ref 32.4–45.2)
HDLC SERPL-MCNC: 43 MG/DL (ref 40–60)
HGB BLD-MCNC: 12.2 GM/DL (ref 10.7–15.3)
INR BLD: 1.21 (ref 0.83–1.09)
LYMPHOCYTES # BLD: 9.3 % (ref 8–40)
MAGNESIUM SERPL-MCNC: 2 MG/DL (ref 1.8–2.4)
MCH RBC QN AUTO: 29.2 PG (ref 25.7–33.7)
MCHC RBC AUTO-ENTMCNC: 31.6 G/DL (ref 32–36)
MCV RBC: 92.3 FL (ref 80–96)
MONOCYTES # BLD AUTO: 8.9 % (ref 3.8–10.2)
NEUTROPHILS # BLD: 81.6 % (ref 42.8–82.8)
PHOSPHATE SERPL-MCNC: 3.6 MG/DL (ref 2.5–4.9)
PLATELET # BLD AUTO: 226 K/MM3 (ref 134–434)
PMV BLD: 8.9 FL (ref 7.5–11.1)
POTASSIUM SERPLBLD-SCNC: 3.8 MMOL/L (ref 3.5–5.1)
PROT SERPL-MCNC: 6.9 G/DL (ref 6.4–8.2)
PT PNL PPP: 14.3 SEC (ref 9.7–13)
RBC # BLD AUTO: 4.17 M/MM3 (ref 3.6–5.2)
SODIUM SERPL-SCNC: 138 MMOL/L (ref 136–145)
TRIGL SERPL-MCNC: 70 MG/DL (ref 0–150)
WBC # BLD AUTO: 11.3 K/MM3 (ref 4–10)

## 2018-10-31 RX ADMIN — AMPICILLIN SODIUM AND SULBACTAM SODIUM SCH MLS/HR: 2; 1 INJECTION, POWDER, FOR SOLUTION INTRAMUSCULAR; INTRAVENOUS at 17:59

## 2018-10-31 RX ADMIN — MORPHINE SULFATE PRN MG: 2 INJECTION, SOLUTION INTRAMUSCULAR; INTRAVENOUS at 09:58

## 2018-10-31 RX ADMIN — AMPICILLIN SODIUM AND SULBACTAM SODIUM SCH MLS/HR: 2; 1 INJECTION, POWDER, FOR SOLUTION INTRAMUSCULAR; INTRAVENOUS at 09:57

## 2018-10-31 RX ADMIN — AMPICILLIN SODIUM AND SULBACTAM SODIUM SCH MLS/HR: 2; 1 INJECTION, POWDER, FOR SOLUTION INTRAMUSCULAR; INTRAVENOUS at 02:32

## 2018-10-31 RX ADMIN — ACETAMINOPHEN PRN MG: 10 INJECTION, SOLUTION INTRAVENOUS at 17:58

## 2018-10-31 RX ADMIN — POTASSIUM CHLORIDE SCH MLS/HR: 7.46 INJECTION, SOLUTION INTRAVENOUS at 00:19

## 2018-10-31 RX ADMIN — PIPERACILLIN AND TAZOBACTAM SCH MLS/HR: 4; .5 INJECTION, POWDER, LYOPHILIZED, FOR SOLUTION INTRAVENOUS at 19:54

## 2018-10-31 RX ADMIN — SODIUM CHLORIDE, POTASSIUM CHLORIDE, SODIUM LACTATE AND CALCIUM CHLORIDE SCH MLS/HR: 600; 310; 30; 20 INJECTION, SOLUTION INTRAVENOUS at 14:46

## 2018-10-31 RX ADMIN — SODIUM CHLORIDE, POTASSIUM CHLORIDE, SODIUM LACTATE AND CALCIUM CHLORIDE SCH MLS/HR: 600; 310; 30; 20 INJECTION, SOLUTION INTRAVENOUS at 06:41

## 2018-10-31 RX ADMIN — MORPHINE SULFATE PRN MG: 2 INJECTION, SOLUTION INTRAMUSCULAR; INTRAVENOUS at 03:37

## 2018-10-31 NOTE — PN
Physical Exam: 


SUBJECTIVE: Patient seen and examined. No acute events overnight. Pt. speaks 

Cypriot and was interpeted by  via phone.  states that we can 

call him at any time for assistance translating, did not confirm with Pt. alone 

if this was acceptable to her. Pt. endorses increased pain on deep respiration. 

Pt. denies N/V/D/C. Pt. denies chest pain, abdominal pain or shortness of 

breath at this time. 








OBJECTIVE:





 Vital Signs











 Period  Temp  Pulse  Resp  BP Sys/Bob  Pulse Ox


 


 Last 24 Hr  97.2 F-99.7 F  75-98  17-20  112-123/68-86  











GENERAL: The patient is awake, alert, and fully oriented, in no acute distress.


HEAD: Normal with no signs of trauma.


EYES: PERRL, sclera anicteric, conjunctiva clear. No ptosis. 


ENT: Ears normal, nares patent, oropharynx clear without exudates, moist mucous 

membranes.


NECK: Trachea midline, full range of motion, supple. 


LUNGS: Decreased breath sounds d/t pain, clear to auscultation bilaterally, no 

wheezes, no crackles, no accessory muscle use. 


HEART: Regular rate and rhythm, S1, S2 without murmur


ABDOMEN: Soft, nontender, nondistended, normoactive bowel sounds, no guarding, 

no rebound- did not assess deep palpation


EXTREMITIES: 2+ dorsal pedal pulses, warm, well-perfused, no edema. 


NEUROLOGICAL: Normal speech, gait not observed.


PSYCH: Normal mood, normal affect.


SKIN: Warm, dry, normal turgor, vertical surgical scar in abdomen.














 Laboratory Results - last 24 hr











  10/30/18 10/30/18 10/30/18





  16:44 16:53 16:53


 


WBC   17.2 H 


 


RBC   4.44 


 


Hgb   13.2 


 


Hct   41.4 


 


MCV   93.2 


 


MCH   29.7 


 


MCHC   31.8 L 


 


RDW   14.5 


 


Plt Count   246 


 


MPV   8.7 


 


Absolute Neuts (auto)   14.8 H 


 


Neutrophils %   86.2 H D 


 


Lymphocytes %   5.9 L D 


 


Monocytes %   7.3 


 


Eosinophils %   0.3  D 


 


Basophils %   0.3 


 


Nucleated RBC %   0 


 


PT with INR    12.60


 


INR    1.07


 


PTT (Actin FS)   


 


Sodium   


 


Potassium   


 


Chloride   


 


Carbon Dioxide   


 


Anion Gap   


 


BUN   


 


Creatinine   


 


Creat Clearance w eGFR   


 


Random Glucose   


 


Calcium   


 


Phosphorus   


 


Magnesium   


 


Total Bilirubin   


 


Direct Bilirubin   


 


AST   


 


ALT   


 


Alkaline Phosphatase   


 


C-Reactive Protein   


 


Total Protein   


 


Albumin   


 


Triglycerides   


 


Cholesterol   


 


Total LDL Cholesterol   


 


HDL Cholesterol   


 


Total Amylase   


 


Lipase  > 31741 H  


 


Beta HCG, Quant  < 1.0  


 


Urine Color   


 


Urine Appearance   


 


Urine pH   


 


Ur Specific Gravity   


 


Urine Protein   


 


Urine Glucose (UA)   


 


Urine Ketones   


 


Urine Blood   


 


Urine Nitrite   


 


Urine Bilirubin   


 


Urine Urobilinogen   


 


Ur Leukocyte Esterase   


 


Urine WBC (Auto)   


 


Urine RBC (Auto)   


 


Ur Epithelial Cells   


 


Urine Mucus   


 


Urine HCG, Qual   


 


Blood Type   


 


Antibody Screen   














  10/30/18 10/30/18 10/30/18





  16:53 16:53 21:04


 


WBC   


 


RBC   


 


Hgb   


 


Hct   


 


MCV   


 


MCH   


 


MCHC   


 


RDW   


 


Plt Count   


 


MPV   


 


Absolute Neuts (auto)   


 


Neutrophils %   


 


Lymphocytes %   


 


Monocytes %   


 


Eosinophils %   


 


Basophils %   


 


Nucleated RBC %   


 


PT with INR   


 


INR   


 


PTT (Actin FS)   


 


Sodium   139 


 


Potassium   3.4 L 


 


Chloride   103 


 


Carbon Dioxide   27 


 


Anion Gap   9 


 


BUN   9 


 


Creatinine   0.7 


 


Creat Clearance w eGFR   > 60 


 


Random Glucose   116 H 


 


Calcium   8.8 


 


Phosphorus   


 


Magnesium   


 


Total Bilirubin   1.4 H 


 


Direct Bilirubin   


 


AST   55 H 


 


ALT   69 H 


 


Alkaline Phosphatase   189 H 


 


C-Reactive Protein   


 


Total Protein   7.8 


 


Albumin   3.8 


 


Triglycerides   


 


Cholesterol   


 


Total LDL Cholesterol   


 


HDL Cholesterol   


 


Total Amylase    > 1300 H


 


Lipase    96436 H


 


Beta HCG, Quant   


 


Urine Color  Dkyellow  


 


Urine Appearance  Clear  


 


Urine pH  5.0  


 


Ur Specific [place]  1.011  


 


Urine Protein  Negative  


 


Urine Glucose (UA)  Negative  


 


Urine Ketones  Negative  


 


Urine Blood  2+ H  


 


Urine Nitrite  Negative  


 


Urine Bilirubin  Negative  


 


Urine Urobilinogen  4.0 e.u/dl H  


 


Ur Leukocyte Esterase  Trace  


 


Urine WBC (Auto)  4  


 


Urine RBC (Auto)  14  


 


Ur Epithelial Cells  Rare  


 


Urine Mucus  Rare  


 


Urine HCG, Qual  Positive  


 


Blood Type   


 


Antibody Screen   














  10/31/18 10/31/18 10/31/18





  06:30 06:30 06:30


 


WBC   11.3 H 


 


RBC   4.17 


 


Hgb   12.2 


 


Hct   38.5 


 


MCV   92.3 


 


MCH   29.2 


 


MCHC   31.6 L 


 


RDW   14.1 


 


Plt Count   226 


 


MPV   8.9 


 


Absolute Neuts (auto)   9.2 H 


 


Neutrophils %   81.6 


 


Lymphocytes %   9.3  D 


 


Monocytes %   8.9 


 


Eosinophils %   0.1 


 


Basophils %   0.1 


 


Nucleated RBC %   0 


 


PT with INR    14.30 H


 


INR    1.21 H


 


PTT (Actin FS)    32.4


 


Sodium   


 


Potassium   


 


Chloride   


 


Carbon Dioxide   


 


Anion Gap   


 


BUN   


 


Creatinine   


 


Creat Clearance w eGFR   


 


Random Glucose   


 


Calcium   


 


Phosphorus   


 


Magnesium   


 


Total Bilirubin   


 


Direct Bilirubin   


 


AST   


 


ALT   


 


Alkaline Phosphatase   


 


C-Reactive Protein   


 


Total Protein   


 


Albumin   


 


Triglycerides   


 


Cholesterol   


 


Total LDL Cholesterol   


 


HDL Cholesterol   


 


Total Amylase   


 


Lipase  9958 H  


 


Beta HCG, Quant   


 


Urine Color   


 


Urine Appearance   


 


Urine pH   


 


Ur Specific Gravity   


 


Urine Protein   


 


Urine Glucose (UA)   


 


Urine Ketones   


 


Urine Blood   


 


Urine Nitrite   


 


Urine Bilirubin   


 


Urine Urobilinogen   


 


Ur Leukocyte Esterase   


 


Urine WBC (Auto)   


 


Urine RBC (Auto)   


 


Ur Epithelial Cells   


 


Urine Mucus   


 


Urine HCG, Qual   


 


Blood Type   


 


Antibody Screen   














  10/31/18 10/31/18 10/31/18





  06:30 06:30 06:30


 


WBC   


 


RBC   


 


Hgb   


 


Hct   


 


MCV   


 


MCH   


 


MCHC   


 


RDW   


 


Plt Count   


 


MPV   


 


Absolute Neuts (auto)   


 


Neutrophils %   


 


Lymphocytes %   


 


Monocytes %   


 


Eosinophils %   


 


Basophils %   


 


Nucleated RBC %   


 


PT with INR   


 


INR   


 


PTT (Actin FS)   


 


Sodium  138  


 


Potassium  3.8  


 


Chloride  104  


 


Carbon Dioxide  25  


 


Anion Gap  8  


 


BUN  6 L  


 


Creatinine  0.6  


 


Creat Clearance w eGFR  > 60  


 


Random Glucose  85  


 


Calcium  8.7  


 


Phosphorus  3.6  


 


Magnesium  2.0  


 


Total Bilirubin  0.6  


 


Direct Bilirubin    0.2


 


AST  27  


 


ALT  48  


 


Alkaline Phosphatase  145 H  


 


C-Reactive Protein    5.7 H


 


Total Protein  6.9  


 


Albumin  3.3 L  


 


Triglycerides   70 


 


Cholesterol   151 


 


Total LDL Cholesterol   97 


 


HDL Cholesterol   43 


 


Total Amylase    > 1300 H


 


Lipase   


 


Beta HCG, Quant   


 


Urine Color   


 


Urine Appearance   


 


Urine pH   


 


Ur Specific Gravity   


 


Urine Protein   


 


Urine Glucose (UA)   


 


Urine Ketones   


 


Urine Blood   


 


Urine Nitrite   


 


Urine Bilirubin   


 


Urine Urobilinogen   


 


Ur Leukocyte Esterase   


 


Urine WBC (Auto)   


 


Urine RBC (Auto)   


 


Ur Epithelial Cells   


 


Urine Mucus   


 


Urine HCG, Qual   


 


Blood Type   


 


Antibody Screen   














  10/31/18





  06:30


 


WBC 


 


RBC 


 


Hgb 


 


Hct 


 


MCV 


 


MCH 


 


MCHC 


 


RDW 


 


Plt Count 


 


MPV 


 


Absolute Neuts (auto) 


 


Neutrophils % 


 


Lymphocytes % 


 


Monocytes % 


 


Eosinophils % 


 


Basophils % 


 


Nucleated RBC % 


 


PT with INR 


 


INR 


 


PTT (Actin FS) 


 


Sodium 


 


Potassium 


 


Chloride 


 


Carbon Dioxide 


 


Anion Gap 


 


BUN 


 


Creatinine 


 


Creat Clearance w eGFR 


 


Random Glucose 


 


Calcium 


 


Phosphorus 


 


Magnesium 


 


Total Bilirubin 


 


Direct Bilirubin 


 


AST 


 


ALT 


 


Alkaline Phosphatase 


 


C-Reactive Protein 


 


Total Protein 


 


Albumin 


 


Triglycerides 


 


Cholesterol 


 


Total LDL Cholesterol 


 


HDL Cholesterol 


 


Total Amylase 


 


Lipase 


 


Beta HCG, Quant 


 


Urine Color 


 


Urine Appearance 


 


Urine pH 


 


Ur Specific Gravity 


 


Urine Protein 


 


Urine Glucose (UA) 


 


Urine Ketones 


 


Urine Blood 


 


Urine Nitrite 


 


Urine Bilirubin 


 


Urine Urobilinogen 


 


Ur Leukocyte Esterase 


 


Urine WBC (Auto) 


 


Urine RBC (Auto) 


 


Ur Epithelial Cells 


 


Urine Mucus 


 


Urine HCG, Qual 


 


Blood Type  O POSITIVE


 


Antibody Screen  Negative








Active Medications





 Current Medications





Acetaminophen (Ofirmev Injection -)  1,000 mg IVPB Q6H PRN


   PRN Reason: Pain Level 4 - 10


Ampicillin Sodium/Sulbactam (Sodium 3 gm/ Sodium Chloride)  100 mls @ 200 mls/

hr IVPB Q8H-IV ASHKAN


   Last Admin: 10/31/18 09:57 Dose:  200 mls/hr


Metronidazole (Flagyl 500mg Premixed Ivpb -)  500 mg in 100 mls @ 100 mls/hr 

IVPB Q8H-IV ASHKAN


   Last Admin: 10/31/18 11:18 Dose:  100 mls/hr


Lactated Ringer's (Lactated Ringers Solution)  1,000 mls @ 150 mls/hr IV ASDIR 

ASHKAN


   Last Admin: 10/31/18 06:41 Dose:  150 mls/hr


Morphine Sulfate (Morphine Sulfate)  2 mg IVPUSH Q4H PRN


   PRN Reason: Pain Level 7-10 BREAKTHROUGH


   Stop: 11/02/18 19:44


   Last Admin: 10/31/18 09:58 Dose:  2 mg


Ondansetron HCl (Zofran Injection)  4 mg IVPUSH Q6H PRN


   PRN Reason: NAUSEA





 Home Medications











 Medication  Instructions  Recorded


 


Metoclopramide HCl [Reglan] 10 mg PO TID 10/30/18


 


Pnv No.95/Ferrous Fum/Folic AC 1 each PO DAILY 10/30/18





[Prenatal Multivitamin Tablet]  








MRCP:


Abd US: inspissated bile sludge in gallbladder lumen, multiple non-obstructing 

stones seen in GB, CBD 0.5cm. 





ASSESSMENT/PLAN:


A 28 y. o. Cypriot speaking F returns to the ED with 2 days Hx. of abdominal 

pain and vomiting, admitted for pancreatitis 2/2 biliary sludge.  





#Gastroenterology


-Acute abdominal pain 2/2 acute pancreatitis 2/2 inspissated biliary sludge 


Abdominal pain 7-9/10 , with 4 times vomiting on admission. No vomiting since


f/u MRCP read


Lipase 30,000-->18,735-->9,958, WBC 17k-->11.3k; c/w trend


Bili 1.4 total, slightly elevated LFTs


c/w NPO 


c/w IVF


Pain control with morphine Q4H


Surgery Consult (Dr. Odom) appreciated 


GI consult(Dr. Soni) appreciated for possible ERCP, also recommend Pump and 

Dump for 1 month is taking medications contraindicated for breast feeding, and 

morphine should be used for breakthrough pain only. Non-narcotics (Tylenol) for 

pain should be used first. 


Bilirubin total and direct 


C/w Unasyn and Flagyl


f/u BCx., UCx. 


UA- 


-Nausea-resolved


c/w Zofran 4mg PRN


failed outpatient management with Reglan





#Infectious Disease


-Bacteremia


BCx. Positive for gram - bacilli


c/w Unasyn





#FEN 


-LR @ 150ml/hr 


-monitor electrolytes, replete if needed 


-NPO till ERCP 





#Ppx.


DVT. 


-SCDs











Visit type





- Emergency Visit


Emergency Visit: Yes


ED Registration Date: 10/30/18


Care time: The patient presented to the Emergency Department on the above date 

and was hospitalized for further evaluation of their emergent condition.





- New Patient


This patient is new to me today: Yes


Date on this admission: 10/31/18





- Critical Care


Critical Care patient: No





- Discharge Referral


Referred to [facility] No

## 2018-10-31 NOTE — CON.GI
Consult


Consult Specialty:: Gastroenterology ( covering Dr Briscoe)


Referred by:: Madina Navas MD


Reason for Consultation:: Pancreatitis





- History of Present Illness


Chief Complaint: Abdominal pain


History of Present Illness: 





28F developed severe upper abdominal pain that radiates into her back and was 

associated with nausea and vomiting. The history was obtained using [employer] 

 Idis # [id number]. The patient is about 4 weeks postpartum and a C 

section. She was seen in the ER and discharged but had to return when the 

pancreatitis developed.  She has gallstones. 





- History Source


History Provided By: Patient


Limitations to Obtaining History: Language Barrier





- Past Medical History


Hepatobiliary: Yes: Cholelithiasis


...Pregnant: No (1 month postpartum)





- Past Surgical History


Past Surgical History: Yes:  (18)





- Alcohol/Substance Use


Hx Alcohol Use: No


History of Substance Use: reports: None





- Smoking History


Smoking history: Never smoked


Have you smoked in the past 12 months: No





- Social History


Usual Living Arrangement: With Spouse


ADL: Independent


Occupation: not employed


Place of Birth: Other (born in [facility] [place]. came to [place] 25)


History of Recent Travel: No





Home Medications





- Allergies


Allergies/Adverse Reactions: 


 Allergies











Allergy/AdvReac Type Severity Reaction Status Date / Time


 


No Known Allergies Allergy   Verified 10/30/18 16:22














- Home Medications


Home Medications: 


Ambulatory Orders





Metoclopramide HCl [Reglan] 10 mg PO TID 10/30/18 


Pnv No.95/Ferrous Fum/Folic AC [Prenatal Multivitamin Tablet] 1 each PO DAILY 10

/30/18 











Family Disease History





- Family Disease History


Family Disease History: Diabetes: Father


Other Family History: cousin had gallbladder out.  GM had vaginal cancer





Review of Systems





- Review of Systems


Constitutional: reports: No Symptoms


Eyes: reports: No Symptoms


HENT: reports: No Symptoms


Neck: reports: No Symptoms


Cardiovascular: reports: No Symptoms


Respiratory: reports: No Symptoms


Gastrointestinal: reports: Abdominal Pain, Vomiting


Musculoskeletal: reports: No Symptoms





Physical Exam-GI


Vital Signs: 


 Vital Signs











Temperature  99.4 F   10/31/18 08:50


 


Pulse Rate  88   10/31/18 08:50


 


Respiratory Rate  19   10/31/18 08:50


 


Blood Pressure  123/75   10/31/18 08:50


 


O2 Sat by Pulse Oximetry (%)  100   10/30/18 22:36








CBC,CMP











WBC  11.3 K/mm3 (4.0-10.0)  H  10/31/18  06:30    


 


RBC  4.17 M/mm3 (3.60-5.2)   10/31/18  06:30    


 


Hgb  12.2 GM/dL (10.7-15.3)   10/31/18  06:30    


 


Hct  38.5 % (32.4-45.2)   10/31/18  06:30    


 


MCV  92.3 fl (80-96)   10/31/18  06:30    


 


MCH  29.2 pg (25.7-33.7)   10/31/18  06:30    


 


MCHC  31.6 g/dl (32.0-36.0)  L  10/31/18  06:30    


 


RDW  14.1 % (11.6-15.6)   10/31/18  06:30    


 


Plt Count  226 K/MM3 (134-434)   10/31/18  06:30    


 


MPV  8.9 fl (7.5-11.1)   10/31/18  06:30    


 


Absolute Neuts (auto)  9.2 K/mm3 (1.5-8.0)  H  10/31/18  06:30    


 


Neutrophils %  81.6 % (42.8-82.8)   10/31/18  06:30    


 


Lymphocytes %  9.3 % (8-40)  D 10/31/18  06:30    


 


Monocytes %  8.9 % (3.8-10.2)   10/31/18  06:30    


 


Eosinophils %  0.1 % (0-4.5)   10/31/18  06:30    


 


Basophils %  0.1 % (0-2.0)   10/31/18  06:30    


 


Nucleated RBC %  0 % (0-0)   10/31/18  06:30    


 


Sodium  138 mmol/L (136-145)   10/31/18  06:30    


 


Potassium  3.8 mmol/L (3.5-5.1)   10/31/18  06:30    


 


Chloride  104 mmol/L ()   10/31/18  06:30    


 


Carbon Dioxide  25 mmol/L (21-32)   10/31/18  06:30    


 


Anion Gap  8 MMOL/L (8-16)   10/31/18  06:30    


 


BUN  6 mg/dL (7-18)  L  10/31/18  06:30    


 


Creatinine  0.6 mg/dL (0.55-1.3)   10/31/18  06:30    


 


Creat Clearance w eGFR  > 60  (>60)   10/31/18  06:30    


 


Random Glucose  85 mg/dL ()   10/31/18  06:30    


 


Calcium  8.7 mg/dL (8.5-10.1)   10/31/18  06:30    


 


Phosphorus  3.6 mg/dL (2.5-4.9)   10/31/18  06:30    


 


Magnesium  2.0 mg/dL (1.8-2.4)   10/31/18  06:30    


 


Total Bilirubin  0.6 mg/dL (0.2-1)   10/31/18  06:30    


 


Direct Bilirubin  0.2 mg/dL (0.0-0.2)   10/31/18  06:30    


 


AST  27 U/L (15-37)   10/31/18  06:30    


 


ALT  48 U/L (13-61)   10/31/18  06:30    


 


Alkaline Phosphatase  145 U/L ()  H  10/31/18  06:30    


 


C-Reactive Protein  5.7 MG/DL (0.00-0.3)  H  10/31/18  06:30    


 


Total Protein  6.9 g/dl (6.4-8.2)   10/31/18  06:30    


 


Albumin  3.3 g/dl (3.4-5.0)  L  10/31/18  06:30    


 


Triglycerides  70 mg/dL (0-150)   10/31/18  06:30    


 


Cholesterol  151 mg/dL ()   10/31/18  06:30    


 


Total LDL Cholesterol  97 mg/dL (5-100)   10/31/18  06:30    


 


HDL Cholesterol  43 mg/dL (40-60)   10/31/18  06:30    


 


Total Amylase  > 1300 U/L ()  H  10/31/18  06:30    


 


Lipase  9958 U/L ()  H  10/31/18  06:30    


 


Beta HCG, Quant  < 1.0 mIU/ml  10/30/18  16:44    








 Current Medications











Generic Name Dose Route Start Last Admin





  Trade Name Freq  PRN Reason Stop Dose Admin


 


Acetaminophen  1,000 mg  10/30/18 21:31  





  Ofirmev Injection -  IVPB   





  Q6H PRN   





  Pain Level 4 - 10   





     





     





     


 


Ampicillin Sodium/Sulbactam  100 mls @ 200 mls/hr  10/30/18 19:15  10/31/18 09:

57





  Sodium 3 gm/ Sodium Chloride  IVPB   200 mls/hr





  Q8H-IV ASHKAN   Administration





     





     





     





     


 


Metronidazole  500 mg in 100 mls @ 100 mls/hr  10/30/18 19:15  10/31/18 11:18





  Flagyl 500mg Premixed Ivpb -  IVPB   100 mls/hr





  Q8H-IV ASHKAN   Administration





     





     





     





     


 


Lactated Ringer's  1,000 mls @ 150 mls/hr  10/30/18 19:45  10/31/18 06:41





  Lactated Ringers Solution  IV   150 mls/hr





  ASDIR ASHKAN   Administration





     





     





     





     


 


Morphine Sulfate  2 mg  10/30/18 21:32  10/31/18 09:58





  Morphine Sulfate  IVPUSH  18 19:44  2 mg





  Q4H PRN   Administration





  Pain Level 7-10 BREAKTHROUGH   





     





     





     


 


Ondansetron HCl  4 mg  10/30/18 19:42  





  Zofran Injection  IVPUSH   





  Q6H PRN   





  NAUSEA   





     





     





     











Constitutional: Yes: Well Nourished


Eyes: Yes: Conjunctiva Clear


HENT: Yes: Normocephalic


Neck: Yes: Supple


Cardiovascular: Yes: Regular Rate and Rhythm


Respiratory: Yes: CTA Bilaterally


Gastrointestinal Inspection: Yes: Scars (healed Pfannensteil incision)


...Auscultate: Yes: Hypoactive Bowel Sounds


...Palpate: Yes: Soft, Tenderness (epigastric tenderness but no peritoneal signs

)


...Rectal Exam: Yes: Deferred


Labs: 


 CBC, BMP





 10/31/18 06:30 





 10/31/18 06:30 





 INR, PTT











INR  1.21  (0.83-1.09)  H  10/31/18  06:30    














Imaging





- Results


MRI: Image Reviewed (the CBD appears clear but await radiology reading)





Assessment/Plan





I believe that Flo has biliary pancreatitis with a passed CBD stone given 

her LFTs pattern.


Using the [employer]  I discussed the potential need for ERCP and 

sphincteormtomy to extract CBD stones. I have informed her of the potential for 

such complications as perforation, hemorrhage and pancreatitis leading to 

multiorgan failure and possible transfusions and emergent surgery. She has 

granted a verbal consent should ERCP prove necessary


I have explained the need for cholecystectomy to prevent repeated biliary 

pancreatittis. cholangitis and cholecystitis. Dr Odom is already on the case. 


I will decrease her IV fluid rate


Continue antibiotics


Await MRCP

## 2018-10-31 NOTE — EKG
Test Reason : 

Blood Pressure : ***/*** mmHG

Vent. Rate : 075 BPM     Atrial Rate : 075 BPM

   P-R Int : 150 ms          QRS Dur : 080 ms

    QT Int : 406 ms       P-R-T Axes : 063 -06 026 degrees

   QTc Int : 453 ms

 

NORMAL SINUS RHYTHM

POSSIBLE LEFT ATRIAL ENLARGEMENT

BORDERLINE ECG

NO PREVIOUS ECGS AVAILABLE

Confirmed by ORLANDO FLOWERS, CHEYENNE ([id number]) on 10/31/2018 10:57:11 AM

 

Referred By:             Confirmed By:CHEYENNE PERRY MD

## 2018-10-31 NOTE — PN
Teaching Attending Note


Name of Resident: Jan Lynne





ATTENDING PHYSICIAN STATEMENT





I saw and evaluated the patient.


I reviewed the resident's note and discussed the case with the resident.


I agree with the resident's findings and plan as documented.








SUBJECTIVE:


No fever ro chills, feeels better  this afternoon. still with some abd pain , 

no n/v . 








OBJECTIVE:


NAD 


Cv : RRR


Lungs: CTAB 


ext : rochelle jacque 


Abd: sfot, ND, TTP in RUQ. and epigastric area 








ASSESSMENT AND PLAN:





29 y/o lady with no significant PMH who presented with abd pain and was found 

to have acute pancreatitis and acute cholecystitis 





1- Acute gall stone pancreatitis and cholecystitis . 


MRCP reviewed. Blood cx + G- organism


- change Abx to zosyn and consult ID 


- repeat blood cx tomorrow 


- appreciate GI and sx input 


- timing of CCY


- NPO 


- IVF and pain control 


 


HLOC

## 2018-11-01 LAB
ACANTHOCYTES BLD QL SMEAR: 0
ALBUMIN SERPL-MCNC: 3 G/DL (ref 3.4–5)
ALP SERPL-CCNC: 117 U/L (ref 45–117)
ALT SERPL-CCNC: 33 U/L (ref 13–61)
ANION GAP SERPL CALC-SCNC: 9 MMOL/L (ref 8–16)
ANISOCYTOSIS BLD QL: 0
AST SERPL-CCNC: 16 U/L (ref 15–37)
BASO STIPL BLD QL SMEAR: 0
BASOPHILS # BLD: 0.6 % (ref 0–2)
BILIRUB SERPL-MCNC: 0.7 MG/DL (ref 0.2–1)
BUN SERPL-MCNC: 4 MG/DL (ref 7–18)
CALCIUM SERPL-MCNC: 8.4 MG/DL (ref 8.5–10.1)
CHLORIDE SERPL-SCNC: 103 MMOL/L (ref 98–107)
CO2 SERPL-SCNC: 26 MMOL/L (ref 21–32)
CREAT SERPL-MCNC: 0.5 MG/DL (ref 0.55–1.3)
DACRYOCYTES BLD QL SMEAR: 0
DEPRECATED RDW RBC AUTO: 14.4 % (ref 11.6–15.6)
DOHLE BOD BLD QL SMEAR: 0
EOSINOPHIL # BLD: 0.7 % (ref 0–4.5)
GLUCOSE SERPL-MCNC: 102 MG/DL (ref 74–106)
HCT VFR BLD CALC: 37.2 % (ref 32.4–45.2)
HELMET CELLS BLD QL SMEAR: 0
HGB BLD-MCNC: 11.7 GM/DL (ref 10.7–15.3)
HOWELL-JOLLY BOD BLD QL SMEAR: 0
LIPASE SERPL-CCNC: 1487 U/L (ref 73–393)
LYMPHOCYTES # BLD: 6.7 % (ref 8–40)
MACROCYTES BLD QL: 0
MAGNESIUM SERPL-MCNC: 1.9 MG/DL (ref 1.8–2.4)
MCH RBC QN AUTO: 29 PG (ref 25.7–33.7)
MCHC RBC AUTO-ENTMCNC: 31.5 G/DL (ref 32–36)
MCV RBC: 91.9 FL (ref 80–96)
MONOCYTES # BLD AUTO: 8.9 % (ref 3.8–10.2)
NEUTROPHILS # BLD: 83.1 % (ref 42.8–82.8)
OVALOCYTES BLD QL SMEAR: 0
PHOSPHATE SERPL-MCNC: 3.2 MG/DL (ref 2.5–4.9)
PLATELET # BLD AUTO: 211 K/MM3 (ref 134–434)
PLATELET BLD QL SMEAR: NORMAL
PMV BLD: 8.5 FL (ref 7.5–11.1)
POTASSIUM SERPLBLD-SCNC: 3.7 MMOL/L (ref 3.5–5.1)
PROT SERPL-MCNC: 6.5 G/DL (ref 6.4–8.2)
RBC # BLD AUTO: 4.05 M/MM3 (ref 3.6–5.2)
ROULEAUX BLD QL SMEAR: 0
SICKLE CELLS BLD QL SMEAR: 0
SODIUM SERPL-SCNC: 138 MMOL/L (ref 136–145)
TARGETS BLD QL SMEAR: 0
TOXIC GRANULES BLD QL SMEAR: 0
WBC # BLD AUTO: 15.6 K/MM3 (ref 4–10)

## 2018-11-01 RX ADMIN — MORPHINE SULFATE PRN MG: 2 INJECTION, SOLUTION INTRAMUSCULAR; INTRAVENOUS at 14:11

## 2018-11-01 RX ADMIN — PIPERACILLIN AND TAZOBACTAM SCH MLS/HR: 4; .5 INJECTION, POWDER, LYOPHILIZED, FOR SOLUTION INTRAVENOUS at 14:15

## 2018-11-01 RX ADMIN — PIPERACILLIN AND TAZOBACTAM SCH MLS/HR: 4; .5 INJECTION, POWDER, LYOPHILIZED, FOR SOLUTION INTRAVENOUS at 01:39

## 2018-11-01 RX ADMIN — PIPERACILLIN AND TAZOBACTAM SCH MLS/HR: 4; .5 INJECTION, POWDER, LYOPHILIZED, FOR SOLUTION INTRAVENOUS at 10:22

## 2018-11-01 RX ADMIN — SODIUM CHLORIDE, POTASSIUM CHLORIDE, SODIUM LACTATE AND CALCIUM CHLORIDE SCH: 600; 310; 30; 20 INJECTION, SOLUTION INTRAVENOUS at 14:14

## 2018-11-01 RX ADMIN — ACETAMINOPHEN PRN MG: 10 INJECTION, SOLUTION INTRAVENOUS at 00:26

## 2018-11-01 RX ADMIN — SODIUM CHLORIDE, POTASSIUM CHLORIDE, SODIUM LACTATE AND CALCIUM CHLORIDE SCH MLS/HR: 600; 310; 30; 20 INJECTION, SOLUTION INTRAVENOUS at 17:20

## 2018-11-01 RX ADMIN — PIPERACILLIN AND TAZOBACTAM SCH MLS/HR: 4; .5 INJECTION, POWDER, LYOPHILIZED, FOR SOLUTION INTRAVENOUS at 21:09

## 2018-11-01 RX ADMIN — ACETAMINOPHEN PRN MG: 10 INJECTION, SOLUTION INTRAVENOUS at 14:33

## 2018-11-01 NOTE — CON.ID
Consult


Consult Specialty:: infectious diseases


Referred by:: 


Reason for Consultation:: bacteremia





- History of Present Illness


Chief Complaint: abd pain


History of Present Illness: 





28F with severe upper abdominal pain that radiates into her back which was 

associated with nausea and vomiting.


patient is post partum about 4 weeks she had a uncomplicated c section


patient initially came to the emergency room it seems and was discharged but 

came back because she was having abd pain and was found to have pancreatitis


patient was worked in up and found to have gall stones,severe pancreatitis and 

also was found to have positive blood cx


continues to have abd pain ,clinically patient looks stable


her lipase has decreased


but her wbc has increased and also patient is spiking fevers


patient started on abx





- History Source


History Provided By: Medical Record


Limitations to Obtaining History: Language Barrier





- Past Medical History


Hepatobiliary: Yes: Cholelithiasis


...Pregnant: No (1 month postpartum)





- Past Surgical History


Past Surgical History: Yes:  (18)





- Alcohol/Substance Use


Hx Alcohol Use: No


History of Substance Use: reports: None





- Smoking History


Smoking history: Never smoked


Have you smoked in the past 12 months: No





- Social History


Usual Living Arrangement: With Spouse


ADL: Independent


Occupation: not employed


History of Recent Travel: No





Home Medications





- Allergies


Allergies/Adverse Reactions: 


 Allergies











Allergy/AdvReac Type Severity Reaction Status Date / Time


 


No Known Allergies Allergy   Verified 10/30/18 16:22














- Home Medications


Home Medications: 


Ambulatory Orders





Metoclopramide HCl [Reglan] 10 mg PO TID 10/30/18 


Pnv No.95/Ferrous Fum/Folic AC [Prenatal Multivitamin Tablet] 1 each PO DAILY 10

/30/18 











Family Disease History





- Family Disease History


Family Disease History: Diabetes: Father


Other Family History: cousin had gallbladder out.  GM had vaginal cancer





Review of Systems





- Review of Systems


Constitutional: reports: Fever


Eyes: reports: No Symptoms


HENT: reports: No Symptoms


Neck: reports: No Symptoms


Cardiovascular: reports: No Symptoms


Respiratory: reports: No Symptoms


Gastrointestinal: reports: Abdominal Pain


Genitourinary: reports: No Symptoms


Musculoskeletal: reports: No Symptoms


Integumentary: reports: No Symptoms


Neurological: reports: No Symptoms


Endocrine: reports: No Symptoms


Hematology/Lymphatic: reports: No Symptoms


Psychiatric: reports: No Symptoms





Physical Exam


Vital Signs: 


 Vital Signs











Temperature  99.3 F   18 06:00


 


Pulse Rate  98 H  18 06:00


 


Respiratory Rate  20   18 06:00


 


Blood Pressure  109/69   18 06:00


 


O2 Sat by Pulse Oximetry (%)  100   10/31/18 21:00











Constitutional: Yes: Well Nourished, Moderate Distress


Eyes: Yes: Conjunctiva Clear


HENT: Yes: Atraumatic, Normocephalic


Neck: Yes: Supple, Trachea Midline


Cardiovascular: Yes: Regular Rate and Rhythm


Respiratory: Yes: Regular, CTA Bilaterally


Gastrointestinal: Yes: Hypoactive Bowel Sounds, Tenderness (ruq), Other


Musculoskeletal: Yes: WNL


Extremities: Yes: WNL


Neurological: Yes: Alert, Oriented


Psychiatric: Yes: Alert, Oriented


Labs: 


 CBC, BMP





 18 06:30 





 18 06:30 











Imaging





- Results


Ultrasound: Report Reviewed, Image Reviewed


MRI: Report Reviewed, Image Reviewed





Assessment/Plan





Problem List





- Problems


(1) Acute biliary pancreatitis without infection or necrosis


Code(s): K85.10 - BILIARY ACUTE PANCREATITIS WITHOUT NECROSIS OR INFECTION   





(2) Calculus of gallbladder and bile duct w/o cholecystitis or obstruction


Code(s): K80.70 - CALCULUS OF GB AND BILE DUCT W/O CHOLECYST W/O OBSTRUCTION   





(3) Disease of digestive system complicating puerperium


Code(s): O99.63 - DISEASES OF THE DIGESTIVE SYSTEM COMPLICATING THE PUERPERIUM 

  





(4) Epigastric abdominal pain


Code(s): R10.13 - EPIGASTRIC PAIN   





(5) Nausea and vomiting


Code(s): R11.2 - NAUSEA WITH VOMITING, UNSPECIFIED   


Qualifiers: 


   Vomiting type: unspecified   Vomiting Intractability: non-intractable   

Qualified Code(s): R11.2 - Nausea with vomiting, unspecified   





fever





leukocytosis





gm negative bacteremia





after looking at the patient i am worried the direction patient is going to 

take.


patient has been started on abx .


patient needs to be closely watched





plan


continue abx


if wbc increases tomorrow will change abx to imipenam


if patient spikes high grade fever then patient will need imagining studies


rest continue current mgmt


hydration


close watch on the patient

## 2018-11-01 NOTE — PN
GI Progress Note


Subjective: 





Describes more abdominal pain today


No acute events overnight








- Objective


Vital Signs: 


 Vital Signs











Temperature  99.3 F   11/01/18 06:00


 


Pulse Rate  98 H  11/01/18 06:00


 


Respiratory Rate  20   11/01/18 06:00


 


Blood Pressure  109/69   11/01/18 06:00


 


O2 Sat by Pulse Oximetry (%)  100   10/31/18 21:00











Constitutional: Calm


Eyes: No: Sclera Icterus


Cardiovascular: Yes: Tachycardia.  No: Murmur


Respiratory: Yes: CTA Bilaterally


Gastrointestinal Inspection: No: Distention


...Auscultate: Yes: Normoactive Bowel Sounds


...Palpate: Yes: Tenderness (TTP mid abdomen, LUQ/LLQ)


...Percussion: No: Tympanitic


Edema: No (No LE edema)


Neurological: Yes: Alert


Labs: 


 CBC, BMP





 11/01/18 06:30 





 11/01/18 06:30 





 INR, PTT











INR  1.21  (0.83-1.09)  H  10/31/18  06:30    














- ....Imaging


MRI: Report Reviewed





Problem List





- Problems


(1) Acute biliary pancreatitis without infection or necrosis


Assessment/Plan: 


Suspected gallstone pancreatitis. Patient still with significant abdominal pain 

on exam and tachycardic this morning.  I have change fluids from maintenance 1/

2 NS to LR @ 200cc/hr for 1 liter followed by 150cc/hr


NPO


If continued worsening of pain and rising WBC's, will likely need repeat 

contrast imaging to assess for development of peripancreatic fluid collection


Daily labs


 


Code(s): K85.10 - BILIARY ACUTE PANCREATITIS WITHOUT NECROSIS OR INFECTION

## 2018-11-01 NOTE — PN
Progress Note, Physician


History of Present Illness: 





29yo [place]ian F, 1 month postpartum from uncomplicated  1 week past 

due date, with gallstone pancreatitis and bacteremia with LF GNR. Feeling better

, pain less, but still with some pain epigastric and LUQ. Hungry, no nausea. 

Fever today, T 101. On Zosyn per ID. 





- Current Medication List


Current Medications: 


Active Medications





Acetaminophen (Ofirmev Injection -)  1,000 mg IVPB Q6H PRN


   PRN Reason: Pain Level 4 - 10


   Last Admin: 18 14:33 Dose:  1,000 mg


Piperacillin Sod/Tazobactam (Sod 4.5 gm/ Dextrose)  100 mls @ 200 mls/hr IVPB 

Q6H-IV ASHKAN


   Last Admin: 18 14:15 Dose:  200 mls/hr


Lactated Ringer's (Lactated Ringers Solution)  1,000 ml in 1,000 mls @ 150 mls/

hr IV ASDIR ASHKAN


   Last Admin: 18 14:14 Dose:  Not Given


Morphine Sulfate (Morphine Sulfate)  2 mg IVPUSH Q4H PRN


   PRN Reason: Pain Level 7-10 BREAKTHROUGH


   Stop: 18 19:44


   Last Admin: 18 14:11 Dose:  2 mg


Ondansetron HCl (Zofran Injection)  4 mg IVPUSH Q6H PRN


   PRN Reason: NAUSEA











- Objective


Vital Signs: 


 Vital Signs











Temperature  101.0 F H  18 14:36


 


Pulse Rate  103 H  18 14:36


 


Respiratory Rate  20   18 14:36


 


Blood Pressure  114/73   18 14:36


 


O2 Sat by Pulse Oximetry (%)  100   10/31/18 21:00








 Vital Signs











 Period  Temp  Pulse  Resp  BP Sys/Bob  Pulse Ox


 


 Last 24 Hr  98.7 F-101.4 F    19-20  109-114/56-78  100











Constitutional: Yes: Well Nourished, No Distress, Calm


Eyes: Yes: Conjunctiva Clear, EOM Intact.  No: Sclera Icterus


HENT: Yes: Atraumatic, Normocephalic


Gastrointestinal: Yes: Soft, Tenderness (LUQ no rebound or guarding), Tenderness

, Epigastrium (less than yesterday)


Musculoskeletal: No: Joint Stiffness, Joint Swelling


Extremities: No: Cool, Cyanosis


Integumentary: No: Jaundice, Rash


Neurological: Yes: Alert, Oriented


Labs: 


 CBC, BMP





 18 06:30 





 18 06:30 





 INR, PTT











INR  1.21  (0.83-1.09)  H  10/31/18  06:30    








 CMP











Sodium  138 mmol/L (136-145)   18  06:30    


 


Potassium  3.7 mmol/L (3.5-5.1)   18  06:30    


 


Chloride  103 mmol/L ()   18  06:30    


 


Carbon Dioxide  26 mmol/L (21-32)   18  06:30    


 


Anion Gap  9 MMOL/L (8-16)   18  06:30    


 


BUN  4 mg/dL (7-18)  L  18  06:30    


 


Creatinine  0.5 mg/dL (0.55-1.3)  L  18  06:30    


 


Creat Clearance w eGFR  > 60  (>60)   18  06:30    


 


Random Glucose  102 mg/dL ()   18  06:30    


 


Calcium  8.4 mg/dL (8.5-10.1)  L  18  06:30    


 


Phosphorus  3.2 mg/dL (2.5-4.9)   18  06:30    


 


Magnesium  1.9 mg/dL (1.8-2.4)   18  06:30    


 


Total Bilirubin  0.7 mg/dL (0.2-1)   18  06:30    


 


    


 


AST  16 U/L (15-37)   18  06:30    


 


ALT  33 U/L (13-61)   18  06:30    


 


Alkaline Phosphatase  117 U/L ()   18  06:30    


 


C-Reactive Protein  5.7 MG/DL (0.00-0.3)  H  10/31/18  06:30    


 


Total Protein  6.5 g/dl (6.4-8.2)   18  06:30    


 


Albumin  3.0 g/dl (3.4-5.0)  L  18  06:30    


 


Triglycerides  70 mg/dL (0-150)   10/31/18  06:30    


 


Cholesterol  151 mg/dL ()   10/31/18  06:30    


 


Total LDL Cholesterol  97 mg/dL (5-100)   10/31/18  06:30    


 


HDL Cholesterol  43 mg/dL (40-60)   10/31/18  06:30    


 


    


 


Lipase  1487 U/L ()  H  18  06:30    


 


Beta HCG, Quant  < 1.0 mIU/ml  10/30/18  16:44    








 Microbiology











 10/30/18 16:53 Urine Culture - Final





 Urine - Urine Clean Catch    NO GROWTH OBTAINED


 


 10/31/18 01:52 Blood Culture - Preliminary





 Blood - Peripheral Venous    Lactose Fermenting Neg Bacilli


 


 10/31/18 01:52 Blood Culture - Preliminary





 Blood - Peripheral Venous    NO GROWTH OBTAINED AFTER 24 HOURS, INCUBATION TO 

CONTINUE





    FOR 4 DAYS.








lipase trending down quickly


LFTs normal today


wbc sideways





- ....Imaging


MRI: Report Reviewed, Image Reviewed (images reviewed - distended gallbladder, 

no filling defects in cbd, multiple small stones in gb neck, significant 

peripancreatic inflammation and pancreatic edema)





Problem List





- Problems


(1) Acute biliary pancreatitis without infection or necrosis


Assessment/Plan: 


admitted to medicine


NPO until pain/tenderness resolve, may consider ice chips sparingly


generous IV hydration


MRCP reviewed - no clear CBD stones and LFTs down, lipase decreasing, so 

probably passed stone(s)


GI consultation noted


trend labs including lipase


bacteremia with LF GNR


on Zosyn per ID


pain meds prn - nonnarcotics first line, morphine breakthrough only





will cool down and treat positive blood cultures


will check with ID on when to repeat


if improving over weekend, may be able to consider lap possible open 

cholecystectomy Monday morning


discussed with Dr. Granados





Code(s): K85.10 - BILIARY ACUTE PANCREATITIS WITHOUT NECROSIS OR INFECTION   





(2) Bacteremia due to Gram-negative bacteria


Assessment/Plan: 


pending organism ID


abx per ID


Code(s): R78.81 - BACTEREMIA   





(3) Calculus of gallbladder and bile duct w/o cholecystitis or obstruction


Code(s): K80.70 - CALCULUS OF GB AND BILE DUCT W/O CHOLECYST W/O OBSTRUCTION   





(4) Disease of digestive system complicating puerperium


Assessment/Plan: 


1 month postpartum


breastfeeding - will need to pump & dump until 48 hours after last 

contraindicated medication intake





Code(s): O99.63 - DISEASES OF THE DIGESTIVE SYSTEM COMPLICATING THE PUERPERIUM 

  





(5) Epigastric abdominal pain


Code(s): R10.13 - EPIGASTRIC PAIN   





(6) Nausea and vomiting


Code(s): R11.2 - NAUSEA WITH VOMITING, UNSPECIFIED   


Qualifiers: 


   Vomiting type: unspecified   Vomiting Intractability: non-intractable   

Qualified Code(s): R11.2 - Nausea with vomiting, unspecified

## 2018-11-01 NOTE — PN
Teaching Attending Note


Name of Resident: Jan Lynne





ATTENDING PHYSICIAN STATEMENT





I saw and evaluated the patient.


I reviewed the resident's note and discussed the case with the resident.


I agree with the resident's findings and plan as documented.








SUBJECTIVE:


fever last night . she feels better , abd pain is better. no N/V. reported one 

episode of hematuria yesterday, no recurrence today. No SOB , but when abd pain 

is worse she has difficulty breathing.  has no cough . 





OBJECTIVE:





NAD, awake , and alert 


Cv: RRR, no mRG 


Lungs: CTAB. with decreased breath sounds at bases  


ext: no edema 


Abd: soft ND, TTP in RUQ and epigastric area. 








ASSESSMENT AND PLAN:





27 y/o lady with no significant PMH who presented with abd pain and was found 

to have acute pancreatitis and acute cholecystitis 





1- Acute gall stone pancreatitis and cholecystitis . Now with fever and G - 

bacteremia  


- follow identification of  organism 


- cont zosyn 


- repeat blood cx . monitor leukocytosis 


- cont NPO 


- if cont to spike fever, will repeat CT w/c to r/o necrosis/collection 


- D/w Dr. Odom. no ccy yet . 


- D/w Dr. José. 





OC

## 2018-11-01 NOTE — PN
Physical Exam: 


SUBJECTIVE: Patient seen and examined. Pt. got temperature to 101.4. Pt. given 

Tylenol and continued with Unasyn. Pt. had a bloody episode of urination 

overnight which resolved spontaneously. Questionable if Pt. had hematuria vs. 

vaginal bleeding. Vaginal exam was performed by nurses overnight and there was 

no bleeding identified in vaginal canal. There was no repeat episodes of blood 

in urine noted. Pt. endorses feeling better today.








OBJECTIVE:





 Vital Signs











 Period  Temp  Pulse  Resp  BP Sys/Bob  Pulse Ox


 


 Last 24 Hr  98.7 F-101.4 F    19-20  109-114/56-78  100











GENERAL: The patient is awake, alert, and fully oriented, in no acute distress.


EYES: sclera anicteric, conjunctiva clear. No ptosis. 


ENT: Ears normal, nares patent, oropharynx clear without exudates, moist mucous 

membranes.


NECK: Trachea midline, full range of motion, supple. 


LUNGS: Decreased breath sounds in posterior axilla, clear to auscultation 

bilaterally, no wheezes, no crackles, no accessory muscle use. 


HEART: Tachycardic, regular rate and rhythm, S1, S2 without murmur


ABDOMEN: Soft, mild tenderness to palpation in epigastrium, nondistended, 

normoactive bowel sounds


EXTREMITIES: 2+ dorsal pedal pulses, warm, well-perfused, clubbed toes, no 

edema. 


NEUROLOGICAL: Normal speech, gait not observed.


PSYCH: Normal mood, normal affect.


SKIN: Warm, dry, normal turgor, vertical surgical scar in abdomen.

















 Laboratory Results - last 24 hr











  11/01/18 11/01/18





  06:30 06:30


 


WBC  15.6 H 


 


RBC  4.05 


 


Hgb  11.7 


 


Hct  37.2 


 


MCV  91.9 


 


MCH  29.0 


 


MCHC  31.5 L 


 


RDW  14.4 


 


Plt Count  211 


 


MPV  8.5 


 


Absolute Neuts (auto)  12.9 H 


 


Neutrophils %  83.1 H 


 


Neutrophils % (Manual)  76.8 


 


Band Neutrophils %  3.0 


 


Lymphocytes %  6.7 L D 


 


Lymphocytes % (Manual)  7.1 L 


 


Monocytes %  8.9 


 


Monocytes % (Manual)  10 


 


Eosinophils %  0.7  D 


 


Eosinophils % (Manual)  0.0 


 


Basophils %  0.6  D 


 


Basophils % (Manual)  1.0 


 


Myelocytes % (Man)  0 


 


Promyelocytes % (Man)  0 


 


Blast Cells % (Manual)  0 


 


Nucleated RBC %  0 


 


Metamyelocytes  0 


 


Hypochromia  0 


 


Toxic Granulation  0 


 


Dohle Bodies  0 


 


Platelet Estimate  Normal 


 


Polychromasia  0 


 


Poikilocytosis  0 


 


Basophilic Stippling  0 


 


Anisocytosis  0 


 


Microcytosis  0 


 


Macrocytosis  0 


 


Spherocytes  0 


 


Sickle Cells  0 


 


Target Cells  0 


 


Tear Drop Cells  0 


 


Ovalocytes  0 


 


Stomatocytes  0 


 


Helmet Cells  0 


 


Chirinos-[place]ountain Bodies  0 


 


Cabot Rings  0 


 


Mickie Cells  0 


 


Acanthocytes (Spur)  0 


 


Rouleaux  0 


 


Fragmented RBCs  0 


 


Schistocytes  0 


 


Sodium   138


 


Potassium   3.7


 


Chloride   103


 


Carbon Dioxide   26


 


Anion Gap   9


 


BUN   4 L


 


Creatinine   0.5 L


 


Creat Clearance w eGFR   > 60


 


Random Glucose   102


 


Calcium   8.4 L


 


Phosphorus   3.2


 


Magnesium   1.9


 


Total Bilirubin   0.7


 


AST   16


 


ALT   33


 


Alkaline Phosphatase   117


 


Total Protein   6.5


 


Albumin   3.0 L


 


Lipase   1487 H








Active Medications





 Current Medications





Acetaminophen (Ofirmev Injection -)  1,000 mg IVPB Q6H PRN


   PRN Reason: Pain Level 4 - 10


   Last Admin: 11/01/18 14:33 Dose:  1,000 mg


Piperacillin Sod/Tazobactam (Sod 4.5 gm/ Dextrose)  100 mls @ 200 mls/hr IVPB 

Q6H-IV ASHKAN


   Last Admin: 11/01/18 14:15 Dose:  200 mls/hr


Lactated Ringer's (Lactated Ringers Solution)  1,000 ml in 1,000 mls @ 150 mls/

hr IV ASDIR ASHKAN


   Last Admin: 11/01/18 14:14 Dose:  Not Given


Morphine Sulfate (Morphine Sulfate)  2 mg IVPUSH Q4H PRN


   PRN Reason: Pain Level 7-10 BREAKTHROUGH


   Stop: 11/02/18 19:44


   Last Admin: 11/01/18 14:11 Dose:  2 mg


Ondansetron HCl (Zofran Injection)  4 mg IVPUSH Q6H PRN


   PRN Reason: NAUSEA





 Home Medications











 Medication  Instructions  Recorded


 


Metoclopramide HCl [Reglan] 10 mg PO TID 10/30/18


 


Pnv No.95/Ferrous Fum/Folic AC 1 each PO DAILY 10/30/18





[Prenatal Multivitamin Tablet]  














ASSESSMENT/PLAN:


A 28 y. o. Togolese speaking F returns to the ED with 2 days Hx. of abdominal 

pain and vomiting, admitted for pancreatitis 2/2 biliary sludge.  





#Gastroenterology


-Acute abdominal pain 2/2 acute pancreatitis 2/2 inspissated biliary sludge 


Abdominal pain 7-9/10 , with 4 times vomiting on admission. No vomiting since


MRCP: Cholelithiasis w/ cholecystitis, irregular gallbladder dome w/ septations

, cannot r/o gangrenous gallbladder, no stones in CBD, no pancreaticobiliary 

ductal dilatation


Lipase 30,000-->18,735-->9,958-->1,487 Per Dr. Granados: will not trend, as not 

clinically relevant, WBC 17k-->11.3k; c/w trend


c/w trending CMPs Q12H for LFTs, LFTs trending down indicating passed stone. 


If LFTs rise suddenly CALL Surgery, as this is medical emergency. Per Surgery, 

will wait until Gallbladder cools down, with CCY tentatively planned for 

Monday.  


c/w NPO 


c/w IVF


Pain control with morphine Q4H


GI consult(Dr. Briscoe) appreciated, if pain or WBCs continue to rise, would 

reccommend imaging w/ contrast 


Bilirubin total and direct 


D/c-ed Unasyn, started Zosyn


BCx: Gram - bacilli


f/u Rpt. BCx. 


UA- , f/u UA


-Nausea-resolved


c/w Zofran 4mg PRN


failed outpatient management with Reglan





#Infectious Disease


-Bacteremia


BCx. Positive for gram - bacilli


f/u repeat BCx.s


D/c-ed Unasyn, started Zosyn





#FEN 


-LR @ 150ml/hr after completing 1L of LR @ 200ml/hr 


-monitor electrolytes, replete if needed 


-NPO 





#Ppx.


DVT. 


-SCDs





Visit type





- Emergency Visit


Emergency Visit: Yes


ED Registration Date: 10/30/18


Care time: The patient presented to the Emergency Department on the above date 

and was hospitalized for further evaluation of their emergent condition.





- New Patient


This patient is new to me today: No





- Critical Care


Critical Care patient: No





- Discharge Referral


Referred to [facility] No

## 2018-11-02 LAB
ALBUMIN SERPL-MCNC: 2.7 G/DL (ref 3.4–5)
ALBUMIN SERPL-MCNC: 2.8 G/DL (ref 3.4–5)
ALP SERPL-CCNC: 100 U/L (ref 45–117)
ALP SERPL-CCNC: 108 U/L (ref 45–117)
ALT SERPL-CCNC: 22 U/L (ref 13–61)
ALT SERPL-CCNC: 24 U/L (ref 13–61)
ANION GAP SERPL CALC-SCNC: 10 MMOL/L (ref 8–16)
ANION GAP SERPL CALC-SCNC: 8 MMOL/L (ref 8–16)
AST SERPL-CCNC: 13 U/L (ref 15–37)
AST SERPL-CCNC: 16 U/L (ref 15–37)
BASOPHILS # BLD: 0.8 % (ref 0–2)
BILIRUB SERPL-MCNC: 0.6 MG/DL (ref 0.2–1)
BILIRUB SERPL-MCNC: 0.6 MG/DL (ref 0.2–1)
BUN SERPL-MCNC: 3 MG/DL (ref 7–18)
BUN SERPL-MCNC: 4 MG/DL (ref 7–18)
CALCIUM SERPL-MCNC: 8.2 MG/DL (ref 8.5–10.1)
CALCIUM SERPL-MCNC: 8.4 MG/DL (ref 8.5–10.1)
CHLORIDE SERPL-SCNC: 104 MMOL/L (ref 98–107)
CHLORIDE SERPL-SCNC: 105 MMOL/L (ref 98–107)
CO2 SERPL-SCNC: 24 MMOL/L (ref 21–32)
CO2 SERPL-SCNC: 26 MMOL/L (ref 21–32)
CREAT SERPL-MCNC: 0.5 MG/DL (ref 0.55–1.3)
CREAT SERPL-MCNC: 0.5 MG/DL (ref 0.55–1.3)
DEPRECATED RDW RBC AUTO: 14.3 % (ref 11.6–15.6)
EOSINOPHIL # BLD: 1 % (ref 0–4.5)
GLUCOSE SERPL-MCNC: 132 MG/DL (ref 74–106)
GLUCOSE SERPL-MCNC: 67 MG/DL (ref 74–106)
HCT VFR BLD CALC: 36.5 % (ref 32.4–45.2)
HGB BLD-MCNC: 11.5 GM/DL (ref 10.7–15.3)
LYMPHOCYTES # BLD: 7.9 % (ref 8–40)
MAGNESIUM SERPL-MCNC: 1.9 MG/DL (ref 1.8–2.4)
MCH RBC QN AUTO: 29.4 PG (ref 25.7–33.7)
MCHC RBC AUTO-ENTMCNC: 31.7 G/DL (ref 32–36)
MCV RBC: 92.7 FL (ref 80–96)
MONOCYTES # BLD AUTO: 9.5 % (ref 3.8–10.2)
NEUTROPHILS # BLD: 80.8 % (ref 42.8–82.8)
PHOSPHATE SERPL-MCNC: 3.4 MG/DL (ref 2.5–4.9)
PLATELET # BLD AUTO: 235 K/MM3 (ref 134–434)
PMV BLD: 9.3 FL (ref 7.5–11.1)
POTASSIUM SERPLBLD-SCNC: 3.6 MMOL/L (ref 3.5–5.1)
POTASSIUM SERPLBLD-SCNC: 3.6 MMOL/L (ref 3.5–5.1)
PROT SERPL-MCNC: 6.4 G/DL (ref 6.4–8.2)
PROT SERPL-MCNC: 6.4 G/DL (ref 6.4–8.2)
RBC # BLD AUTO: 3.93 M/MM3 (ref 3.6–5.2)
SODIUM SERPL-SCNC: 138 MMOL/L (ref 136–145)
SODIUM SERPL-SCNC: 139 MMOL/L (ref 136–145)
WBC # BLD AUTO: 14.2 K/MM3 (ref 4–10)

## 2018-11-02 RX ADMIN — PIPERACILLIN AND TAZOBACTAM SCH MLS/HR: 4; .5 INJECTION, POWDER, LYOPHILIZED, FOR SOLUTION INTRAVENOUS at 15:00

## 2018-11-02 RX ADMIN — PIPERACILLIN AND TAZOBACTAM SCH MLS/HR: 4; .5 INJECTION, POWDER, LYOPHILIZED, FOR SOLUTION INTRAVENOUS at 21:30

## 2018-11-02 RX ADMIN — SODIUM CHLORIDE, POTASSIUM CHLORIDE, SODIUM LACTATE AND CALCIUM CHLORIDE SCH MLS/HR: 600; 310; 30; 20 INJECTION, SOLUTION INTRAVENOUS at 14:00

## 2018-11-02 RX ADMIN — PIPERACILLIN AND TAZOBACTAM SCH MLS/HR: 4; .5 INJECTION, POWDER, LYOPHILIZED, FOR SOLUTION INTRAVENOUS at 02:46

## 2018-11-02 RX ADMIN — PIPERACILLIN AND TAZOBACTAM SCH MLS/HR: 4; .5 INJECTION, POWDER, LYOPHILIZED, FOR SOLUTION INTRAVENOUS at 09:03

## 2018-11-02 NOTE — PN
Physical Exam: 


SUBJECTIVE: Patient seen and examined. Pt. states decreased abdominal pain from 

yesterday. Pt. denies any more blood in urine. 








OBJECTIVE:





 Vital Signs











 Period  Temp  Pulse  Resp  BP Sys/Bob  Pulse Ox


 


 Last 24 Hr  99.7 F-101.0 F    18-20  103-128/61-74  98











GENERAL: The patient is awake, alert, in no acute distress.


EYES: sclera anicteric, conjunctiva clear. No ptosis. 


ENT: Ears normal, nares patent, oropharynx clear without exudates, moist mucous 

membranes.


NECK: Trachea midline, full range of motion, supple. 


LUNGS: Breath sounds equal, clear to auscultation bilaterally, no wheezes, no 

crackles, no accessory muscle use. 


HEART: Regular rate and rhythm, S1, S2 without murmur, rub or gallop.


ABDOMEN: Soft, nontender, nondistended, normoactive bowel sounds, no guarding, 

no rebound, tympanic to percussion. 


EXTREMITIES: 2+ dorsal pedal pulses, warm, no calf tenderness, well-perfused, 

trace edema. 


NEUROLOGICAL: Normal speech, gait not observed.


PSYCH: Normal mood, normal affect.


SKIN: Warm, dry, normal turgor














 Laboratory Results - last 24 hr











  11/01/18 11/01/18 11/02/18





  06:30 06:30 06:50


 


WBC   


 


RBC   


 


Hgb   


 


Hct   


 


MCV   


 


MCH   


 


MCHC   


 


RDW   


 


Plt Count   


 


MPV   


 


Absolute Neuts (auto)   


 


Neutrophils %   


 


Neutrophils % (Manual)  76.8  


 


Band Neutrophils %  3.0  


 


Lymphocytes %   


 


Lymphocytes % (Manual)  7.1 L  


 


Monocytes %   


 


Monocytes % (Manual)  10  


 


Eosinophils %   


 


Eosinophils % (Manual)  0.0  


 


Basophils %   


 


Basophils % (Manual)  1.0  


 


Myelocytes % (Man)  0  


 


Promyelocytes % (Man)  0  


 


Blast Cells % (Manual)  0  


 


Nucleated RBC %   


 


Metamyelocytes  0  


 


Hypochromia  0  


 


Toxic Granulation  0  


 


Dohle Bodies  0  


 


Platelet Estimate  Normal  


 


Polychromasia  0  


 


Poikilocytosis  0  


 


Basophilic Stippling  0  


 


Anisocytosis  0  


 


Microcytosis  0  


 


Macrocytosis  0  


 


Spherocytes  0  


 


Sickle Cells  0  


 


Target Cells  0  


 


Tear Drop Cells  0  


 


Ovalocytes  0  


 


Stomatocytes  0  


 


Helmet Cells  0  


 


[place] Bodies  0  


 


Cabot Rings  0  


 


[place] Cells  0  


 


Acanthocytes (Spur)  0  


 


Rouleaux  0  


 


Fragmented RBCs  0  


 


Schistocytes  0  


 


Sodium   138  139


 


Potassium   3.7  3.6


 


Chloride   103  105


 


Carbon Dioxide   26  24


 


Anion Gap   9  10


 


BUN   4 L  4 L


 


Creatinine   0.5 L  0.5 L


 


Creat Clearance w eGFR   > 60  > 60


 


Random Glucose   102  67 L


 


Calcium   8.4 L  8.2 L


 


Phosphorus   3.2  3.4


 


Magnesium   1.9  1.9


 


Total Bilirubin   0.7  0.6


 


AST   16  16


 


ALT   33  24


 


Alkaline Phosphatase   117  108


 


C-Reactive Protein    18.5 H


 


Total Protein   6.5  6.4


 


Albumin   3.0 L  2.8 L


 


Lipase   1487 H 














  11/02/18 11/02/18





  06:50 06:50


 


WBC  14.2 H 


 


RBC  3.93 


 


Hgb  11.5 


 


Hct  36.5 


 


MCV  92.7 


 


MCH  29.4 


 


MCHC  31.7 L 


 


RDW  14.3 


 


Plt Count  235 


 


MPV  9.3 


 


Absolute Neuts (auto)  11.5 H 


 


Neutrophils %  80.8 


 


Neutrophils % (Manual)  


 


Band Neutrophils %  


 


Lymphocytes %  7.9 L 


 


Lymphocytes % (Manual)  


 


Monocytes %  9.5 


 


Monocytes % (Manual)  


 


Eosinophils %  1.0 


 


Eosinophils % (Manual)  


 


Basophils %  0.8 


 


Basophils % (Manual)  


 


Myelocytes % (Man)  


 


Promyelocytes % (Man)  


 


Blast Cells % (Manual)  


 


Nucleated RBC %  0 


 


Metamyelocytes  


 


Hypochromia  


 


Toxic Granulation  


 


Dohle Bodies  


 


Platelet Estimate  


 


Polychromasia  


 


Poikilocytosis  


 


Basophilic Stippling  


 


Anisocytosis  


 


Microcytosis  


 


Macrocytosis  


 


Spherocytes  


 


Sickle Cells  


 


Target Cells  


 


Tear Drop Cells  


 


Ovalocytes  


 


Stomatocytes  


 


Helmet Cells  


 


[place] Bodies  


 


Cabot Rings  


 


Mickie Cells  


 


Acanthocytes (Spur)  


 


Rouleaux  


 


Fragmented RBCs  


 


Schistocytes  


 


Sodium  


 


Potassium  


 


Chloride  


 


Carbon Dioxide  


 


Anion Gap  


 


BUN  


 


Creatinine  


 


Creat Clearance w eGFR  


 


Random Glucose  


 


Calcium  


 


Phosphorus  


 


Magnesium  


 


Total Bilirubin  


 


AST  


 


ALT  


 


Alkaline Phosphatase  


 


C-Reactive Protein  


 


Total Protein  


 


Albumin  


 


Lipase   517 H








Active Medications





  Current Medications





Acetaminophen (Ofirmev Injection -)  1,000 mg IVPB Q6H PRN


   PRN Reason: Pain Level 4 - 10


   Last Admin: 11/01/18 14:33 Dose:  1,000 mg


Piperacillin Sod/Tazobactam (Sod 4.5 gm/ Dextrose)  100 mls @ 200 mls/hr IVPB 

Q6H-IV ASHKAN


   Last Admin: 11/02/18 09:03 Dose:  200 mls/hr


Dextrose/Lactated Ringer's (D5-Lr -)  1,000 mls @ 150 mls/hr IV ASDIR ASHKAN


Morphine Sulfate (Morphine Sulfate)  2 mg IVPUSH Q4H PRN


   PRN Reason: Pain Level 7-10 BREAKTHROUGH


   Stop: 11/02/18 19:44


   Last Admin: 11/01/18 14:11 Dose:  2 mg


Ondansetron HCl (Zofran Injection)  4 mg IVPUSH Q6H PRN


   PRN Reason: NAUSEA











 Home Medications











 Medication  Instructions  Recorded


 


Metoclopramide HCl [Reglan] 10 mg PO TID 10/30/18


 


Pnv No.95/Ferrous Fum/Folic AC 1 each PO DAILY 10/30/18





[Prenatal Multivitamin Tablet]  














ASSESSMENT/PLAN:


A 28 y. o. Argentine speaking F with abdominal pain and vomiting, admitted for 

pancreatitis 2/2 biliary sludge.  





#Gastroenterology


-Acute abdominal pain 2/2 acute pancreatitis 2/2 inspissated biliary sludge 


No vomiting since admission. 


MRCP: Cholelithiasis w/ cholecystitis, irregular gallbladder dome w/ septations

, cannot r/o gangrenous gallbladder, no stones in CBD, no pancreaticobiliary 

ductal dilatation


Lipase trending down, WBC trending down


c/w trending CMPs Q12H for LFTs, LFTs trending down indicating passed stone. 


If LFTs rise suddenly CALL Surgery, as this is medical emergency. Per Surgery, 

will wait until Gallbladder cools down, with CCY tentatively planned for 

Monday.  


c/w NPO 


c/w IVF


Pain control with non-narcotics first line. 


GI consult(Dr. Briscoe) appreciated, if pain or WBCs continue to rise, would 

reccommend imaging w/ contrast 


Bilirubin unremarkable 


f/u Rpt. BCx. 


-Nausea-resolved


c/w Zofran 4mg PRN





#Infectious Disease


-Bacteremia


BCx. Positive for Klebsiella sensitive to Zosyn


f/u repeat BCx.s


c/w Zosyn





#FEN 


-Switched to D5-LR @ 150ml/hr b/c Pt. is NPO and glucose was 67


-monitor electrolytes, replete if needed 


-NPO 





#Ppx.


DVT. 


-SCDs


-Early Ambulation





Visit type





- Emergency Visit


Emergency Visit: Yes


ED Registration Date: 10/30/18


Care time: The patient presented to the Emergency Department on the above date 

and was hospitalized for further evaluation of their emergent condition.





- New Patient


This patient is new to me today: No





- Critical Care


Critical Care patient: No





- Discharge Referral


Referred to [facility] No

## 2018-11-02 NOTE — PN
Teaching Attending Note


Name of Resident: Jan Lynne





ATTENDING PHYSICIAN STATEMENT





I saw and evaluated the patient.


I reviewed the resident's note and discussed the case with the resident.


I agree with the resident's findings and plan as documented.








SUBJECTIVE:





feels much better


OBJECTIVE:


NAD, awake , and alert 


Cv: RRR, no mRG 


Lungs: CTAB.


ext: no edema 


Abd: soft ND, no TTP . nl BS 








ASSESSMENT AND PLAN:





27 y/o lady with no significant PMH who presented with abd pain and was found 

to have acute pancreatitis and acute cholecystitis 





1- Acute gall stone pancreatitis and cholecystitis . with Klebsiella bacteremia

  


- cont zosyn 


- follow blood cx 


- D/w Dr. Odom. CCY on MOnday  


- D/w Dr. José. 


- clears 





HLOC 





:

## 2018-11-02 NOTE — PN
Progress Note, Physician


History of Present Illness: 





looking better


feels better


wbc trending down


abd pain better





- Current Medication List


Current Medications: 


Active Medications





Acetaminophen (Ofirmev Injection -)  1,000 mg IVPB Q6H PRN


   PRN Reason: Pain Level 4 - 10


   Last Admin: 11/01/18 14:33 Dose:  1,000 mg


Piperacillin Sod/Tazobactam (Sod 4.5 gm/ Dextrose)  100 mls @ 200 mls/hr IVPB 

Q6H-IV ASHKAN


   Last Admin: 11/02/18 09:03 Dose:  200 mls/hr


Lactated Ringer's (Lactated Ringers Solution)  1,000 ml in 1,000 mls @ 150 mls/

hr IV ASDIR ASHKAN


   Last Admin: 11/01/18 17:20 Dose:  150 mls/hr


Morphine Sulfate (Morphine Sulfate)  2 mg IVPUSH Q4H PRN


   PRN Reason: Pain Level 7-10 BREAKTHROUGH


   Stop: 11/02/18 19:44


   Last Admin: 11/01/18 14:11 Dose:  2 mg


Ondansetron HCl (Zofran Injection)  4 mg IVPUSH Q6H PRN


   PRN Reason: NAUSEA











- Objective


Vital Signs: 


 Vital Signs











Temperature  99.5 F   11/02/18 08:00


 


Pulse Rate  94 H  11/02/18 08:00


 


Respiratory Rate  20   11/02/18 09:00


 


Blood Pressure  121/64   11/02/18 08:00


 


O2 Sat by Pulse Oximetry (%)  98   11/02/18 09:00











Constitutional: Yes: Calm, Mild Distress


Cardiovascular: Yes: Regular Rate and Rhythm


Respiratory: Yes: Regular, CTA Bilaterally


Gastrointestinal: Yes: Normal Bowel Sounds, Soft


Musculoskeletal: Yes: WNL


Extremities: Yes: WNL


Neurological: Yes: Alert, Oriented


Labs: 


 CBC, BMP





 11/02/18 06:50 





 11/02/18 06:50 





 INR, PTT











INR  1.21  (0.83-1.09)  H  10/31/18  06:30    














Assessment/Plan





Problem List





- Problems


(1) Acute biliary pancreatitis without infection or necrosis


Code(s): K85.10 - BILIARY ACUTE PANCREATITIS WITHOUT NECROSIS OR INFECTION   





(2) Calculus of gallbladder and bile duct w/o cholecystitis or obstruction


Code(s): K80.70 - CALCULUS OF GB AND BILE DUCT W/O CHOLECYST W/O OBSTRUCTION   





(3) Disease of digestive system complicating puerperium


Code(s): O99.63 - DISEASES OF THE DIGESTIVE SYSTEM COMPLICATING THE PUERPERIUM 

  





(4) Epigastric abdominal pain


Code(s): R10.13 - EPIGASTRIC PAIN   





(5) Nausea and vomiting


Code(s): R11.2 - NAUSEA WITH VOMITING, UNSPECIFIED   


Qualifiers: 


   Vomiting type: unspecified   Vomiting Intractability: non-intractable   

Qualified Code(s): R11.2 - Nausea with vomiting, unspecified   





fever





leukocytosis





gm negative bacteremia











plan


continue abx


monitor wbc


monitor fevers


rest as per the team

## 2018-11-02 NOTE — PN
GI Progress Note


Subjective: 





States feeling better


Low grade temps noted














- Objective


Vital Signs: 


 Vital Signs











Temperature  100.3 F H  11/02/18 06:26


 


Pulse Rate  101 H  11/02/18 06:26


 


Respiratory Rate  20   11/02/18 06:26


 


Blood Pressure  111/68   11/02/18 06:26


 


O2 Sat by Pulse Oximetry (%)  98   11/01/18 21:00











Constitutional: Calm


Eyes: No: Sclera Icterus


Cardiovascular: Yes: Regular Rate and Rhythm


Respiratory: Yes: CTA Bilaterally


Gastrointestinal Inspection: No: Distention


...Auscultate: Yes: No Bowel Sounds


...Palpate: Yes: Tenderness (Much improved TTP in the upper abdomen and left 

abdomen)


...Percussion: No: Tympanitic


Edema: No (No LE edema)


Neurological: Yes: Alert (, awake)


Labs: 


 CBC, BMP





 11/02/18 06:50 





 11/02/18 06:50 





 INR, PTT











INR  1.21  (0.83-1.09)  H  10/31/18  06:30    








 Laboratory Tests











  10/31/18 11/02/18





  06:30 06:50


 


C-Reactive Protein  5.7 H  18.5 H














Problem List





- Problems


(1) Acute biliary pancreatitis without infection or necrosis


Assessment/Plan: 


Clinically much improved from yesterday


Continue IV hydration. Clears if OK with surgery


Klebsiella bacteremia being treated. ID following


Timing of cholecystectomy per surgery


AM labs





Code(s): K85.10 - BILIARY ACUTE PANCREATITIS WITHOUT NECROSIS OR INFECTION

## 2018-11-02 NOTE — PN
Progress Note, Physician


History of Present Illness: 





27yo [place] F, 1 month postpartum from uncomplicated  1 week past 

due date, with gallstone pancreatitis and bacteremia with Klebsiella, sensitive 

to Zosyn, which she is on per ID. Feeling better, no pain today. Thirsty. Using 

IS. Seen in room in bed, TAJ Vilchis assisted with Armenian,  was on 

speakerphone. Repeat blood culture has no growth x24 hours to date.





- Current Medication List


Current Medications: 


Active Medications





Acetaminophen (Ofirmev Injection -)  1,000 mg IVPB Q6H PRN


   PRN Reason: Pain Level 4 - 10


   Last Admin: 18 14:33 Dose:  1,000 mg


Piperacillin Sod/Tazobactam (Sod 4.5 gm/ Dextrose)  100 mls @ 200 mls/hr IVPB 

Q6H-IV ASHKAN


   Last Admin: 18 09:03 Dose:  200 mls/hr


Dextrose/Lactated Ringer's (D5-Lr -)  1,000 mls @ 150 mls/hr IV ASDIR ASHKAN


Morphine Sulfate (Morphine Sulfate)  2 mg IVPUSH Q4H PRN


   PRN Reason: Pain Level 7-10 BREAKTHROUGH


   Stop: 18 19:44


   Last Admin: 18 14:11 Dose:  2 mg


Ondansetron HCl (Zofran Injection)  4 mg IVPUSH Q6H PRN


   PRN Reason: NAUSEA











- Objective


Vital Signs: 


 Vital Signs











Temperature  98.9 F   18 13:52


 


Pulse Rate  87   18 13:52


 


Respiratory Rate  20   18 09:00


 


Blood Pressure  115/70   18 13:52


 


O2 Sat by Pulse Oximetry (%)  98   18 09:00








 Vital Signs











 Period  Temp  Pulse  Resp  BP Sys/Bob  Pulse Ox


 


 Last 24 Hr  98.9 F-100.5 F    18-20  103-128/61-74  98-98








temp curve down


Constitutional: Yes: Well Nourished, No Distress, Calm


Eyes: Yes: Conjunctiva Clear, EOM Intact.  No: Sclera Icterus


HENT: Yes: Atraumatic, Normocephalic


Gastrointestinal: Yes: Soft, Tenderness (mild/minimal LLQ, no R/G).  No: 

Distention, Tenderness, Epigastrium


...Rectal Exam: Yes: Deferred


Musculoskeletal: No: Joint Stiffness, Joint Swelling


Extremities: No: Cool, Cyanosis


Integumentary: No: Jaundice, Rash


Neurological: Yes: Alert, Oriented


Labs: 


 CBC, BMP





 18 06:50 





 18 06:50 





 INR, PTT











INR  1.21  (0.83-1.09)  H  10/31/18  06:30    








 CMP











Sodium  139 mmol/L (136-145)   18  06:50    


 


Potassium  3.6 mmol/L (3.5-5.1)   18  06:50    


 


Chloride  105 mmol/L ()   18  06:50    


 


Carbon Dioxide  24 mmol/L (21-32)   18  06:50    


 


Anion Gap  10 MMOL/L (8-16)   18  06:50    


 


BUN  4 mg/dL (7-18)  L  18  06:50    


 


Creatinine  0.5 mg/dL (0.55-1.3)  L  18  06:50    


 


Creat Clearance w eGFR  > 60  (>60)   18  06:50    


 


Random Glucose  67 mg/dL ()  L  18  06:50    


 


Calcium  8.2 mg/dL (8.5-10.1)  L  18  06:50    


 


Phosphorus  3.4 mg/dL (2.5-4.9)   18  06:50    


 


Magnesium  1.9 mg/dL (1.8-2.4)   18  06:50    


 


Total Bilirubin  0.6 mg/dL (0.2-1)   18  06:50    


 


Direct Bilirubin  0.2 mg/dL (0.0-0.2)   10/31/18  06:30    


 


AST  16 U/L (15-37)   18  06:50    


 


ALT  24 U/L (13-61)   18  06:50    


 


Alkaline Phosphatase  108 U/L ()   18  06:50    


 


C-Reactive Protein  18.5 MG/DL (0.00-0.3)  H  18  06:50    


 


Total Protein  6.4 g/dl (6.4-8.2)   18  06:50    


 


Albumin  2.8 g/dl (3.4-5.0)  L  18  06:50    


 


Triglycerides  70 mg/dL (0-150)   10/31/18  06:30    


 


Cholesterol  151 mg/dL ()   10/31/18  06:30    


 


Total LDL Cholesterol  97 mg/dL (5-100)   10/31/18  06:30    


 


HDL Cholesterol  43 mg/dL (40-60)   10/31/18  06:30    


 


Total Amylase  > 1300 U/L ()  H  10/31/18  06:30    


 


Lipase  517 U/L ()  H  18  06:50    


 


Beta HCG, Quant  < 1.0 mIU/ml  10/30/18  16:44    








lipase down further, LFTs normal


wbc coming down





Problem List





- Problems


(1) Acute biliary pancreatitis without infection or necrosis


Assessment/Plan: 


admitted to medicine


continue IVF for now, will lower rate


will start clears - would not advance yet


if increased or recurrent pain, to stop and tell RN/MD


GI following


trend labs including lipase


bacteremia with Klebsiella, on Zosyn per ID


pain meds prn - nonnarcotics first line, morphine breakthrough only


encouraged to ambulate in halls, use IS, be OOB





planning lap possible open cholecystectomy Monday morning


discussed with Dr. Granados





Code(s): K85.10 - BILIARY ACUTE PANCREATITIS WITHOUT NECROSIS OR INFECTION   





(2) Bacteremia due to Gram-negative bacteria


Assessment/Plan: 


Klebsiella, sens to Zosyn


ID following


repeat cx neg to date


Code(s): R78.81 - BACTEREMIA   





(3) Calculus of gallbladder and bile duct w/o cholecystitis or obstruction


Code(s): K80.70 - CALCULUS OF GB AND BILE DUCT W/O CHOLECYST W/O OBSTRUCTION   





(4) Disease of digestive system complicating puerperium


Assessment/Plan: 


1 month postpartum


breastfeeding - will need to pump & dump until 48 hours after last 

contraindicated medication intake





Code(s): O99.63 - DISEASES OF THE DIGESTIVE SYSTEM COMPLICATING THE PUERPERIUM 

  





(5) Epigastric abdominal pain


Code(s): R10.13 - EPIGASTRIC PAIN   





(6) Nausea and vomiting


Code(s): R11.2 - NAUSEA WITH VOMITING, UNSPECIFIED   


Qualifiers: 


   Vomiting type: unspecified   Vomiting Intractability: non-intractable   

Qualified Code(s): R11.2 - Nausea with vomiting, unspecified

## 2018-11-03 LAB
ALBUMIN SERPL-MCNC: 2.7 G/DL (ref 3.4–5)
ALBUMIN SERPL-MCNC: 2.7 G/DL (ref 3.4–5)
ALP SERPL-CCNC: 92 U/L (ref 45–117)
ALP SERPL-CCNC: 94 U/L (ref 45–117)
ALT SERPL-CCNC: 20 U/L (ref 13–61)
ALT SERPL-CCNC: 21 U/L (ref 13–61)
ANION GAP SERPL CALC-SCNC: 10 MMOL/L (ref 8–16)
ANION GAP SERPL CALC-SCNC: 9 MMOL/L (ref 8–16)
AST SERPL-CCNC: 10 U/L (ref 15–37)
AST SERPL-CCNC: 11 U/L (ref 15–37)
BASOPHILS # BLD: 0.3 % (ref 0–2)
BILIRUB SERPL-MCNC: 0.5 MG/DL (ref 0.2–1)
BILIRUB SERPL-MCNC: 0.6 MG/DL (ref 0.2–1)
BUN SERPL-MCNC: 2 MG/DL (ref 7–18)
BUN SERPL-MCNC: 2 MG/DL (ref 7–18)
CALCIUM SERPL-MCNC: 8.1 MG/DL (ref 8.5–10.1)
CALCIUM SERPL-MCNC: 8.2 MG/DL (ref 8.5–10.1)
CHLORIDE SERPL-SCNC: 106 MMOL/L (ref 98–107)
CHLORIDE SERPL-SCNC: 107 MMOL/L (ref 98–107)
CO2 SERPL-SCNC: 24 MMOL/L (ref 21–32)
CO2 SERPL-SCNC: 26 MMOL/L (ref 21–32)
CREAT SERPL-MCNC: 0.5 MG/DL (ref 0.55–1.3)
CREAT SERPL-MCNC: 0.6 MG/DL (ref 0.55–1.3)
DEPRECATED RDW RBC AUTO: 13.7 % (ref 11.6–15.6)
EOSINOPHIL # BLD: 1.6 % (ref 0–4.5)
GLUCOSE SERPL-MCNC: 102 MG/DL (ref 74–106)
GLUCOSE SERPL-MCNC: 111 MG/DL (ref 74–106)
HCT VFR BLD CALC: 34.9 % (ref 32.4–45.2)
HGB BLD-MCNC: 11.6 GM/DL (ref 10.7–15.3)
LIPASE SERPL-CCNC: 502 U/L (ref 73–393)
LYMPHOCYTES # BLD: 10.5 % (ref 8–40)
MAGNESIUM SERPL-MCNC: 2 MG/DL (ref 1.8–2.4)
MCH RBC QN AUTO: 30.5 PG (ref 25.7–33.7)
MCHC RBC AUTO-ENTMCNC: 33.2 G/DL (ref 32–36)
MCV RBC: 91.9 FL (ref 80–96)
MONOCYTES # BLD AUTO: 10.8 % (ref 3.8–10.2)
NEUTROPHILS # BLD: 76.8 % (ref 42.8–82.8)
PHOSPHATE SERPL-MCNC: 3.3 MG/DL (ref 2.5–4.9)
PLATELET # BLD AUTO: 251 K/MM3 (ref 134–434)
PMV BLD: 9 FL (ref 7.5–11.1)
POTASSIUM SERPLBLD-SCNC: 3.3 MMOL/L (ref 3.5–5.1)
POTASSIUM SERPLBLD-SCNC: 4.1 MMOL/L (ref 3.5–5.1)
PROT SERPL-MCNC: 6.4 G/DL (ref 6.4–8.2)
PROT SERPL-MCNC: 6.4 G/DL (ref 6.4–8.2)
RBC # BLD AUTO: 3.8 M/MM3 (ref 3.6–5.2)
SODIUM SERPL-SCNC: 140 MMOL/L (ref 136–145)
SODIUM SERPL-SCNC: 141 MMOL/L (ref 136–145)
WBC # BLD AUTO: 12.5 K/MM3 (ref 4–10)

## 2018-11-03 RX ADMIN — PIPERACILLIN AND TAZOBACTAM SCH MLS/HR: 4; .5 INJECTION, POWDER, LYOPHILIZED, FOR SOLUTION INTRAVENOUS at 21:36

## 2018-11-03 RX ADMIN — PIPERACILLIN AND TAZOBACTAM SCH MLS/HR: 4; .5 INJECTION, POWDER, LYOPHILIZED, FOR SOLUTION INTRAVENOUS at 09:53

## 2018-11-03 RX ADMIN — PIPERACILLIN AND TAZOBACTAM SCH MLS/HR: 4; .5 INJECTION, POWDER, LYOPHILIZED, FOR SOLUTION INTRAVENOUS at 14:45

## 2018-11-03 RX ADMIN — PIPERACILLIN AND TAZOBACTAM SCH MLS/HR: 4; .5 INJECTION, POWDER, LYOPHILIZED, FOR SOLUTION INTRAVENOUS at 02:46

## 2018-11-03 NOTE — PN
Progress Note, Physician


History of Present Illness: 





patient doing well


no abd pain now


looks much better





- Current Medication List


Current Medications: 


Active Medications





Acetaminophen (Tylenol -)  650 mg PO Q6H PRN


   PRN Reason: PAIN LEVEL 6-10


Piperacillin Sod/Tazobactam (Sod 4.5 gm/ Dextrose)  100 mls @ 200 mls/hr IVPB 

Q6H-IV ASHKAN


   Last Admin: 11/03/18 09:53 Dose:  200 mls/hr


Ondansetron HCl (Zofran Injection)  4 mg IVPUSH Q6H PRN


   PRN Reason: NAUSEA











- Objective


Vital Signs: 


 Vital Signs











Temperature  98.6 F   11/03/18 09:30


 


Pulse Rate  84   11/03/18 09:30


 


Respiratory Rate  20   11/03/18 09:30


 


Blood Pressure  106/64   11/03/18 09:30


 


O2 Sat by Pulse Oximetry (%)  96   11/03/18 10:00











Constitutional: Yes: No Distress, Calm


Cardiovascular: Yes: Regular Rate and Rhythm


Respiratory: Yes: Regular, CTA Bilaterally


Gastrointestinal: Yes: Normal Bowel Sounds, Hypoactive Bowel Sounds


Musculoskeletal: Yes: WNL


Extremities: Yes: WNL


Neurological: Yes: Alert, Oriented


Psychiatric: Yes: Alert, Oriented


Labs: 


 CBC, BMP





 11/03/18 08:00 





 11/03/18 08:00 





 INR, PTT











INR  1.21  (0.83-1.09)  H  10/31/18  06:30    














Assessment/Plan





Problem List





- Problems


(1) Acute biliary pancreatitis without infection or necrosis


Code(s): K85.10 - BILIARY ACUTE PANCREATITIS WITHOUT NECROSIS OR INFECTION   





(2) Calculus of gallbladder and bile duct w/o cholecystitis or obstruction


Code(s): K80.70 - CALCULUS OF GB AND BILE DUCT W/O CHOLECYST W/O OBSTRUCTION   





(3) Disease of digestive system complicating puerperium


Code(s): O99.63 - DISEASES OF THE DIGESTIVE SYSTEM COMPLICATING THE PUERPERIUM 

  





(4) Epigastric abdominal pain


Code(s): R10.13 - EPIGASTRIC PAIN   





(5) Nausea and vomiting


Code(s): R11.2 - NAUSEA WITH VOMITING, UNSPECIFIED   


Qualifiers: 


   Vomiting type: unspecified   Vomiting Intractability: non-intractable   

Qualified Code(s): R11.2 - Nausea with vomiting, unspecified   





fever





leukocytosis





gm negative bacteremia





patient has cleared her bacteremia,wbc trending down











plan


continue abx


monitor wbc


monitor fevers


rest as per the team


rest as per surgery

## 2018-11-03 NOTE — PN
Teaching Attending Note


Name of Resident: Jan Lynne





ATTENDING PHYSICIAN STATEMENT





I saw and evaluated the patient.


I reviewed the resident's note and discussed the case with the resident.


I agree with the resident's findings and plan as documented.








SUBJECTIVE:


no abd pain , no diarrhea , no N/V. 





OBJECTIVE:


NAD


Cv: RRR, no mRG 


Lungs: CTAB.


ext: no edema 


Abd: soft ND, no TTP . nl BS 








ASSESSMENT AND PLAN:





29 y/o lady with no significant PMH who presented with abd pain and was found 

to have acute pancreatitis and acute cholecystitis 





1- Acute gall stone pancreatitis and cholecystitis . with Klebsiella bacteremia 


- fever curve improved, LFTs remains normal, and lukocytosis improved 

.clinically better 


- cont zosyn 


- follow blood cx ( Neg x 48 hr) 


- CCY on MOnday  


- D/w Dr. José. 


- cont clears 


- replete K 





HLOC

## 2018-11-03 NOTE — PN
Physical Exam: 


SUBJECTIVE: Patient seen and examined at bedside. No complaints at this time. 

Feels much better.








OBJECTIVE:





 Vital Signs











 Period  Temp  Pulse  Resp  BP Sys/Bob  Pulse Ox


 


 Last 24 Hr  98.1 F-100 F  74-84  18-20  106-123/61-82  96-98











Gen: nad, lying in bed


HEENT: NCAT, EOMI


Neck: supple, no jvd


Cardio: rrr, normal s1s2


Pulm: CTA b/l


Abd: nondist, soft, NO tenderness


Ext: 2+ pulses














 Laboratory Results - last 24 hr











  11/02/18 11/03/18 11/03/18





  17:45 08:00 08:00


 


WBC   12.5 H 


 


RBC   3.80 


 


Hgb   11.6 


 


Hct   34.9 


 


MCV   91.9 


 


MCH   30.5 


 


MCHC   33.2 


 


RDW   13.7 


 


Plt Count   251 


 


MPV   9.0 


 


Absolute Neuts (auto)   9.6 H 


 


Neutrophils %   76.8 


 


Lymphocytes %   10.5  D 


 


Monocytes %   10.8 H 


 


Eosinophils %   1.6 


 


Basophils %   0.3 


 


Nucleated RBC %   0 


 


Sodium  138   140


 


Potassium  3.6   3.3 L


 


Chloride  104   106


 


Carbon Dioxide  26   26


 


Anion Gap  8   9


 


BUN  3 L   2 L*


 


Creatinine  0.5 L   0.5 L


 


Creat Clearance w eGFR  > 60   > 60


 


Random Glucose  132 H   102


 


Calcium  8.4 L   8.2 L


 


Phosphorus    3.3


 


Magnesium    2.0


 


Total Bilirubin  0.6   0.6


 


AST  13 L   10 L


 


ALT  22   20


 


Alkaline Phosphatase  100   94


 


Total Protein  6.4   6.4


 


Albumin  2.7 L   2.7 L


 


Lipase    502 H














  11/03/18





  08:00


 


WBC 


 


RBC 


 


Hgb 


 


Hct 


 


MCV 


 


MCH 


 


MCHC 


 


RDW 


 


Plt Count 


 


MPV 


 


Absolute Neuts (auto) 


 


Neutrophils % 


 


Lymphocytes % 


 


Monocytes % 


 


Eosinophils % 


 


Basophils % 


 


Nucleated RBC % 


 


Sodium 


 


Potassium 


 


Chloride 


 


Carbon Dioxide 


 


Anion Gap 


 


BUN 


 


Creatinine 


 


Creat Clearance w eGFR 


 


Random Glucose 


 


Calcium 


 


Phosphorus 


 


Magnesium 


 


Total Bilirubin 


 


AST 


 


ALT 


 


Alkaline Phosphatase 


 


Total Protein 


 


Albumin 


 


Lipase  Cancelled








Active Medications











Generic Name Dose Route Start Last Admin





  Trade Name Freq  PRN Reason Stop Dose Admin


 


Acetaminophen  650 mg  11/03/18 11:30  





  Tylenol -  PO   





  Q6H PRN   





  PAIN LEVEL 6-10   





     





     





     


 


Piperacillin Sod/Tazobactam  100 mls @ 200 mls/hr  11/01/18 09:00  11/03/18 14:

45





  Sod 4.5 gm/ Dextrose  IVPB   200 mls/hr





  Q6H-IV ASHKAN   Administration





     





     





     





     


 


Ondansetron HCl  4 mg  10/30/18 19:42  





  Zofran Injection  IVPUSH   





  Q6H PRN   





  NAUSEA   





     





     





     











ASSESSMENT/PLAN:





Pt is a 29 y/o F with no PMH who presented to ED with abd pain. Pt was admitted 

for pancreatitis.





#Acute pancreatitis


-2/2 to gall stones


-Klebsiella bacteremia - resolved


-zosyn


-ID on board


-surg on board


-plan for ccy mon morning





#FEN


-not on fluids


-lytes wnl


-clears








Dispo


-plan for surg mon





Jan Lynne MD PGY-2 IM


























Visit type





- Emergency Visit


Emergency Visit: No





- New Patient


This patient is new to me today: No





- Critical Care


Critical Care patient: No





- Discharge Referral


Referred to [facility] No

## 2018-11-03 NOTE — PN
Progress Note, Physician


History of Present Illness: 





29yo Peter F, 1 month postpartum from uncomplicated  1 week past 

due date, with gallstone pancreatitis and bacteremia with Klebsiella, sensitive 

to Zosyn, which she is on per ID. Repeat blood culture  has no growth to 

date. Feeling better, no pain. Using IS. Ambulating in room. Voiding ok, had 

soft BM. Tolerating clear liquids.





- Current Medication List


Current Medications: 


Active Medications





Piperacillin Sod/Tazobactam (Sod 4.5 gm/ Dextrose)  100 mls @ 200 mls/hr IVPB 

Q6H-IV ASHKAN


   Last Admin: 18 09:53 Dose:  200 mls/hr


Ondansetron HCl (Zofran Injection)  4 mg IVPUSH Q6H PRN


   PRN Reason: NAUSEA











- Objective


Vital Signs: 


 Vital Signs











Temperature  98.6 F   18 09:30


 


Pulse Rate  84   18 09:30


 


Respiratory Rate  20   18 09:30


 


Blood Pressure  106/64   18 09:30


 


O2 Sat by Pulse Oximetry (%)  96   18 10:00











Constitutional: Yes: Well Nourished, No Distress, Calm


Eyes: Yes: Conjunctiva Clear, EOM Intact.  No: Sclera Icterus


HENT: Yes: Atraumatic, Normocephalic


Gastrointestinal: Yes: Soft.  No: Distention, Tenderness, Tenderness, 

Epigastrium


Extremities: No: Cool, Cyanosis


Integumentary: No: Jaundice, Rash


Neurological: Yes: Alert, Oriented


Labs: 


 CBC, BMP





 18 08:00 





 18 08:00 





 


 CMP











Sodium  140 mmol/L (136-145)   18  08:00    


 


Potassium  3.3 mmol/L (3.5-5.1)  L  18  08:00    


 


Chloride  106 mmol/L ()   18  08:00    


 


Carbon Dioxide  26 mmol/L (21-32)   18  08:00    


 


Anion Gap  9 MMOL/L (8-16)   18  08:00    


 


BUN  2 mg/dL (7-18)  L*  18  08:00    


 


Creatinine  0.5 mg/dL (0.55-1.3)  L  18  08:00    


 


Creat Clearance w eGFR  > 60  (>60)   18  08:00    


 


Random Glucose  102 mg/dL ()   18  08:00    


 


Calcium  8.2 mg/dL (8.5-10.1)  L  18  08:00    


 


Phosphorus  3.3 mg/dL (2.5-4.9)   18  08:00    


 


Magnesium  2.0 mg/dL (1.8-2.4)   18  08:00    


 


Total Bilirubin  0.6 mg/dL (0.2-1)   18  08:00    


 


Direct Bilirubin  0.2 mg/dL (0.0-0.2)   10/31/18  06:30    


 


AST  10 U/L (15-37)  L  18  08:00    


 


ALT  20 U/L (13-61)   18  08:00    


 


Alkaline Phosphatase  94 U/L ()   18  08:00    


 


C-Reactive Protein  18.5 MG/DL (0.00-0.3)  H  18  06:50    


 


Total Protein  6.4 g/dl (6.4-8.2)   18  08:00    


 


Albumin  2.7 g/dl (3.4-5.0)  L  18  08:00    


 


Triglycerides  70 mg/dL (0-150)   10/31/18  06:30    


 


Cholesterol  151 mg/dL ()   10/31/18  06:30    


 


Total LDL Cholesterol  97 mg/dL (5-100)   10/31/18  06:30    


 


HDL Cholesterol  43 mg/dL (40-60)   10/31/18  06:30    


 


Total Amylase  > 1300 U/L ()  H  10/31/18  06:30    


 


Lipase  502 U/L ()  H  18  08:00    


 


Beta HCG, Quant  < 1.0 mIU/ml  10/30/18  16:44    








wbc and lipase down 


K low today





Problem List





- Problems


(1) Acute biliary pancreatitis without infection or necrosis


Assessment/Plan: 


continue clear liquids until surgery


if increased or recurrent pain, to stop and tell RN/MD


will stop IVF unless pain returns


replete K+


GI following


trend labs, wbc coming down


bacteremia with Klebsiella, clearing; on Zosyn per ID


pain meds prn - nonnarcotics first line


encouraged to ambulate in halls, use IS, be OOB





planning lap possible open cholecystectomy Monday morning





Code(s): K85.10 - BILIARY ACUTE PANCREATITIS WITHOUT NECROSIS OR INFECTION   





(2) Bacteremia due to Gram-negative bacteria


Assessment/Plan: 


Klebsiella, sens to Zosyn


ID following


repeat cx neg to date x 2d


Code(s): R78.81 - BACTEREMIA   





(3) Calculus of gallbladder and bile duct w/o cholecystitis or obstruction


Code(s): K80.70 - CALCULUS OF GB AND BILE DUCT W/O CHOLECYST W/O OBSTRUCTION   





(4) Disease of digestive system complicating puerperium


Assessment/Plan: 


1 month postpartum


breastfeeding - will need to pump & dump until 48 hours after last 

contraindicated medication intake





Code(s): O99.63 - DISEASES OF THE DIGESTIVE SYSTEM COMPLICATING THE PUERPERIUM 

  





(5) Epigastric abdominal pain


Code(s): R10.13 - EPIGASTRIC PAIN   





(6) Nausea and vomiting


Code(s): R11.2 - NAUSEA WITH VOMITING, UNSPECIFIED   


Qualifiers: 


   Vomiting type: unspecified   Vomiting Intractability: non-intractable   

Qualified Code(s): R11.2 - Nausea with vomiting, unspecified

## 2018-11-04 LAB
ALBUMIN SERPL-MCNC: 2.8 G/DL (ref 3.4–5)
ALBUMIN SERPL-MCNC: 2.9 G/DL (ref 3.4–5)
ALP SERPL-CCNC: 93 U/L (ref 45–117)
ALP SERPL-CCNC: 98 U/L (ref 45–117)
ALT SERPL-CCNC: 18 U/L (ref 13–61)
ALT SERPL-CCNC: 20 U/L (ref 13–61)
ANION GAP SERPL CALC-SCNC: 8 MMOL/L (ref 8–16)
ANION GAP SERPL CALC-SCNC: 9 MMOL/L (ref 8–16)
AST SERPL-CCNC: 10 U/L (ref 15–37)
AST SERPL-CCNC: 11 U/L (ref 15–37)
BILIRUB SERPL-MCNC: 0.4 MG/DL (ref 0.2–1)
BILIRUB SERPL-MCNC: 0.4 MG/DL (ref 0.2–1)
BUN SERPL-MCNC: 2 MG/DL (ref 7–18)
BUN SERPL-MCNC: 3 MG/DL (ref 7–18)
CALCIUM SERPL-MCNC: 8.4 MG/DL (ref 8.5–10.1)
CALCIUM SERPL-MCNC: 8.4 MG/DL (ref 8.5–10.1)
CHLORIDE SERPL-SCNC: 106 MMOL/L (ref 98–107)
CHLORIDE SERPL-SCNC: 106 MMOL/L (ref 98–107)
CO2 SERPL-SCNC: 26 MMOL/L (ref 21–32)
CO2 SERPL-SCNC: 27 MMOL/L (ref 21–32)
CREAT SERPL-MCNC: 0.5 MG/DL (ref 0.55–1.3)
CREAT SERPL-MCNC: 0.5 MG/DL (ref 0.55–1.3)
GLUCOSE SERPL-MCNC: 88 MG/DL (ref 74–106)
GLUCOSE SERPL-MCNC: 91 MG/DL (ref 74–106)
POTASSIUM SERPLBLD-SCNC: 3.6 MMOL/L (ref 3.5–5.1)
POTASSIUM SERPLBLD-SCNC: 4 MMOL/L (ref 3.5–5.1)
PROT SERPL-MCNC: 6.4 G/DL (ref 6.4–8.2)
PROT SERPL-MCNC: 6.9 G/DL (ref 6.4–8.2)
SODIUM SERPL-SCNC: 140 MMOL/L (ref 136–145)
SODIUM SERPL-SCNC: 141 MMOL/L (ref 136–145)

## 2018-11-04 RX ADMIN — PIPERACILLIN AND TAZOBACTAM SCH MLS/HR: 4; .5 INJECTION, POWDER, LYOPHILIZED, FOR SOLUTION INTRAVENOUS at 22:00

## 2018-11-04 RX ADMIN — PIPERACILLIN AND TAZOBACTAM SCH MLS/HR: 4; .5 INJECTION, POWDER, LYOPHILIZED, FOR SOLUTION INTRAVENOUS at 15:10

## 2018-11-04 RX ADMIN — PIPERACILLIN AND TAZOBACTAM SCH MLS/HR: 4; .5 INJECTION, POWDER, LYOPHILIZED, FOR SOLUTION INTRAVENOUS at 08:15

## 2018-11-04 RX ADMIN — PIPERACILLIN AND TAZOBACTAM SCH MLS/HR: 4; .5 INJECTION, POWDER, LYOPHILIZED, FOR SOLUTION INTRAVENOUS at 01:59

## 2018-11-04 NOTE — PN
Progress Note, Physician


History of Present Illness: 





29yo [place] F, 1 month postpartum from uncomplicated  1 week past 

due date, with gallstone pancreatitis and bacteremia with Klebsiella, sensitive 

to Zosyn, which she is on per ID. Repeat blood culture  is negative to 

date. Feeling better, no pain. Using IS. Ambulating some. Voiding ok, passing 

gas, no BM today. Tolerating clear liquids.  at bedside, assisted with 

conversation in Occitan.





- Current Medication List


Current Medications: 


Active Medications





Acetaminophen (Tylenol -)  650 mg PO Q6H PRN


   PRN Reason: PAIN LEVEL 6-10


Piperacillin Sod/Tazobactam (Sod 4.5 gm/ Dextrose)  100 mls @ 200 mls/hr IVPB 

Q6H-IV ASHKAN


   Last Admin: 18 15:10 Dose:  200 mls/hr


Ondansetron HCl (Zofran Injection)  4 mg IVPUSH Q6H PRN


   PRN Reason: NAUSEA











- Objective


Vital Signs: 


 Vital Signs











Temperature  98.4 F   18 14:37


 


Pulse Rate  78   18 14:37


 


Respiratory Rate  18   18 14:37


 


Blood Pressure  112/68   18 14:37


 


O2 Sat by Pulse Oximetry (%)  97   18 09:00








 Vital Signs











 Period  Temp  Pulse  Resp  BP Sys/Bob  Pulse Ox


 


 Last 24 Hr  98.0 F-98.8 F  70-90  18-20  /62-68  96-97











Constitutional: Yes: Well Nourished, No Distress, Calm


Eyes: Yes: Conjunctiva Clear, EOM Intact.  No: Sclera Icterus


HENT: Yes: Atraumatic, Normocephalic


Gastrointestinal: Yes: Soft.  No: Distention, Tenderness, Tenderness, 

Epigastrium


Breast(s): Yes: Other (not lactating anymore since first day or two here)


Musculoskeletal: No: Joint Stiffness, Joint Swelling


Extremities: No: Cool, Cyanosis


Integumentary: Yes: Incision (healing Pfannenstiel scar).  No: Jaundice, Rash


Neurological: Yes: Alert, Oriented


Labs: 


 BMP





 


 18 07:30 





  Microbiology











 18 13:45 Blood Culture - Preliminary





 Blood - Peripheral Venous    NO GROWTH OBTAINED AFTER 72 HOURS, INCUBATION TO 

CONTINUE





    FOR 2 DAYS.


 


 18 12:50 Blood Culture - Preliminary





 Blood - Peripheral Venous    NO GROWTH OBTAINED AFTER 72 HOURS, INCUBATION TO 

CONTINUE





    FOR 2 DAYS.


 


 10/31/18 01:52 Blood Culture - Preliminary





 Blood - Peripheral Venous    NO GROWTH OBTAINED AFTER 96 HOURS, INCUBATION TO 

CONTINUE





    FOR 1 DAYS.














Problem List





- Problems


(1) Acute biliary pancreatitis without infection or necrosis


Assessment/Plan: 


clear liquids, NPO after midnight


will resume IVF at midnight





bacteremia with Klebsiella, clearing; on Zosyn per ID


plan to continue through 1-2 postop doses then stop


pain meds prn - nonnarcotics first line


encouraged to ambulate in halls, use IS, be OOB





Discussed with patient and  via Occ[place] phone  #[phone] risks, 

benefits and alternatives of laparoscopic possible open cholecystectomy, 

including but not limited to bleeding, infection, injury to adjacent structures

, bile leak or ductal injury, intraabdominal abscess, incisional hernia, need 

for further procedures; alternatives include antibiotics, delayed or no surgery 

- risks of this include recurrence of pancreatitis, cholangitis, cholecystitis, 

.


Patient desires to proceed with operation - will take to OR tomorrow morning 

for above, planned for 9am. Informed consent signed for same.








Code(s): K85.10 - BILIARY ACUTE PANCREATITIS WITHOUT NECROSIS OR INFECTION   





(2) Bacteremia due to Gram-negative bacteria


Assessment/Plan: 


Klebsiella, sens to Zosyn


ID following


repeat cx neg to date


Code(s): R78.81 - BACTEREMIA   





(3) Calculus of gallbladder and bile duct w/o cholecystitis or obstruction


Code(s): K80.70 - CALCULUS OF GB AND BILE DUCT W/O CHOLECYST W/O OBSTRUCTION   





(4) Disease of digestive system complicating puerperium


Assessment/Plan: 


1 month postpartum


was breastfeeding - will need to pump & dump until 48 hours after last 

contraindicated medication intake


per pt/, she is not lactating anymore


explained milk may come back after she gets home with baby, but she will need 

to wait until Thursday to resume


Code(s): O99.63 - DISEASES OF THE DIGESTIVE SYSTEM COMPLICATING THE PUERPERIUM 

  





(5) Epigastric abdominal pain


Code(s): R10.13 - EPIGASTRIC PAIN   





(6) Nausea and vomiting


Code(s): R11.2 - NAUSEA WITH VOMITING, UNSPECIFIED   


Qualifiers: 


   Vomiting type: unspecified   Vomiting Intractability: non-intractable   

Qualified Code(s): R11.2 - Nausea with vomiting, unspecified

## 2018-11-04 NOTE — PN
Progress Note (short form)





- Note


Progress Note: 





Subjective:


no fever or chills. No abd pain . no diarhea . 





Objective:








Vital Signs:


 Last Vital Signs











Temp Pulse Resp BP Pulse Ox


 


 98.8 F   90   18   103/63   97 


 


 11/04/18 09:00  11/04/18 09:00  11/04/18 09:00  11/04/18 09:00  11/04/18 09:00








 Laboratory Results - last 24 hr











  11/03/18 11/04/18





  18:15 07:30


 


Sodium  141  140


 


Potassium  4.1  3.6


 


Chloride  107  106


 


Carbon Dioxide  24  26


 


Anion Gap  10  8


 


BUN  2 L*  3 L


 


Creatinine  0.6  0.5 L


 


Creat Clearance w eGFR  > 60  > 60


 


Random Glucose  111 H  91


 


Calcium  8.1 L  8.4 L


 


Total Bilirubin  0.5  0.4


 


AST  11 L  11 L


 


ALT  21  18


 


Alkaline Phosphatase  92  93


 


Total Protein  6.4  6.4


 


Albumin  2.7 L  2.8 L











Physical Exam:


NAD


Cv: RRR, no mRG 


Lungs: CTAB.


ext: no edema 


Abd: soft ND, no TTP . nl BS 








ASSESSMENT AND PLAN:





27 y/o lady with no significant PMH who presented with abd pain and was found 

to have acute pancreatitis and acute cholecystitis 





1- Acute gall stone pancreatitis and cholecystitis . with Klebsiella bacteremia 


- fever resolved , and WBC improved 


- cont zosyn 


- repeat Blood cx NGTD


- CCY on MOnday  


- D/w Dr. José today 


-NPO after MN  





HLOC 














Visit type





- Emergency Visit


Emergency Visit: Yes


ED Registration Date: 10/30/18


Care time: The patient presented to the Emergency Department on the above date 

and was hospitalized for further evaluation of their emergent condition.





- New Patient


This patient is new to me today: No





- Critical Care


Critical Care patient: No

## 2018-11-05 LAB
BASOPHILS # BLD: 0.7 % (ref 0–2)
DEPRECATED RDW RBC AUTO: 14 % (ref 11.6–15.6)
EOSINOPHIL # BLD: 4.7 % (ref 0–4.5)
HCT VFR BLD CALC: 34.2 % (ref 32.4–45.2)
HGB BLD-MCNC: 11.6 GM/DL (ref 10.7–15.3)
LYMPHOCYTES # BLD: 10.3 % (ref 8–40)
MCH RBC QN AUTO: 31.1 PG (ref 25.7–33.7)
MCHC RBC AUTO-ENTMCNC: 34 G/DL (ref 32–36)
MCV RBC: 91.4 FL (ref 80–96)
MONOCYTES # BLD AUTO: 10 % (ref 3.8–10.2)
NEUTROPHILS # BLD: 74.3 % (ref 42.8–82.8)
PLATELET # BLD AUTO: 332 K/MM3 (ref 134–434)
PMV BLD: 8.6 FL (ref 7.5–11.1)
RBC # BLD AUTO: 3.74 M/MM3 (ref 3.6–5.2)
WBC # BLD AUTO: 8.9 K/MM3 (ref 4–10)

## 2018-11-05 PROCEDURE — 0FT44ZZ RESECTION OF GALLBLADDER, PERCUTANEOUS ENDOSCOPIC APPROACH: ICD-10-PCS

## 2018-11-05 RX ADMIN — IBUPROFEN SCH MG: 600 TABLET, FILM COATED ORAL at 22:18

## 2018-11-05 RX ADMIN — IBUPROFEN SCH MG: 600 TABLET, FILM COATED ORAL at 15:32

## 2018-11-05 RX ADMIN — PIPERACILLIN AND TAZOBACTAM SCH MLS/HR: 4; .5 INJECTION, POWDER, LYOPHILIZED, FOR SOLUTION INTRAVENOUS at 22:19

## 2018-11-05 RX ADMIN — PIPERACILLIN AND TAZOBACTAM SCH MLS/HR: 4; .5 INJECTION, POWDER, LYOPHILIZED, FOR SOLUTION INTRAVENOUS at 08:32

## 2018-11-05 RX ADMIN — PIPERACILLIN AND TAZOBACTAM SCH MLS/HR: 4; .5 INJECTION, POWDER, LYOPHILIZED, FOR SOLUTION INTRAVENOUS at 02:29

## 2018-11-05 RX ADMIN — ACETAMINOPHEN SCH MG: 325 TABLET ORAL at 18:43

## 2018-11-05 RX ADMIN — PIPERACILLIN AND TAZOBACTAM SCH MLS/HR: 4; .5 INJECTION, POWDER, LYOPHILIZED, FOR SOLUTION INTRAVENOUS at 15:32

## 2018-11-05 NOTE — OP
Operative Note





- Note:


Operative Date: 11/05/18


Pre-Operative Diagnosis: gallstone pancreatitis, bacteremia


Operation: laparoscopic cholecystectomy


Findings: 





elongated gallbladder, critical view obtained; small stones palpable in gb 

after removal


Post-Operative Diagnosis: Same as Pre-op


Surgeon: Cesar Odom


Assistant: Piter Garg (julio cesar/KELLY Dueñas, MS3)


Anesthesiologist/CRNA: Ilir Acevedo


Anesthesia: General, Local (20ml 0.5% marcaine)


Specimens Removed: gallbladder to pathology


Estimated Blood Loss (mls): 5


Fluid Volume Replaced (mls): 1,800 (crystalloid)


Operative Report Dictated: Yes

## 2018-11-05 NOTE — PN
Teaching Attending Note


Name of Resident: Kannan Barboza





ATTENDING PHYSICIAN STATEMENT





I saw and evaluated the patient.


I reviewed the resident's note and discussed the case with the resident.


I agree with the resident's findings and plan as documented.








SUBJECTIVE:


No fever or chills . had her sx today , still no flatus, no BM . no SOB . mild 

abd pain . No N/V 





OBJECTIVE:


NAD


Cv: RRR, no MRG 


Lungs: CTAB.


ext: no edema 


Abd: soft ND, no TTP . nl BS 








ASSESSMENT AND PLAN:





27 y/o lady with no significant PMH who presented with abd pain and was found 

to have acute pancreatitis and acute cholecystitis 





1- Acute gall stone pancreatitis and cholecystitis . with Klebsiella bacteremia 


- s/p CCY today 


- doing well . 


- cont zosyn 


- repeat Blood cx NGTD


- low fat diet 








possible dc tomorrow

## 2018-11-05 NOTE — PN
Progress Note, Physician


History of Present Illness: 





stable


post op


doing well





- Current Medication List


Current Medications: 


Active Medications





Acetaminophen (Tylenol -)  650 mg PO Q6H ASHKAN


Lactated Ringer's (Lactated Ringers Solution)  1,000 ml in 1,000 mls @ 83 mls/

hr IV ASDIR A[facility]KAN


Piperacillin Sod/Tazobactam (Sod 4.5 gm/ Dextrose)  100 mls @ 200 mls/hr IVPB 

Q6H-IV ASHKAN


Ibuprofen (Motrin -)  600 mg PO Q6H A[facility]KAN


Ondansetron HCl (Zofran Injection)  4 mg IVPUSH Q6H PRN


   PRN Reason: NAUSEA


Prenatal Multivit/Folic Acid/Iron (Prenatal Vitamins (Sjr) -)  1 tab PO DAILY 

ASHKAN











- Objective


Vital Signs: 


 Vital Signs











Temperature  97.8 F   11/05/18 11:37


 


Pulse Rate  68   11/05/18 11:37


 


Respiratory Rate  18   11/05/18 11:37


 


Blood Pressure  125/75   11/05/18 11:37


 


O2 Sat by Pulse Oximetry (%)  96   11/05/18 11:37











Constitutional: Yes: No Distress, Calm


Cardiovascular: Yes: Regular Rate and Rhythm


Respiratory: Yes: Regular, CTA Bilaterally


Gastrointestinal: Yes: Normal Bowel Sounds, Soft


Musculoskeletal: Yes: WNL


Extremities: Yes: WNL


Wound/Incision: Yes: Clean/Dry


Neurological: Yes: Alert, Oriented


Psychiatric: Yes: Alert, Oriented


Labs: 


 CBC, BMP





 11/05/18 07:30 





 11/04/18 17:35 





 INR, PTT











INR  1.21  (0.83-1.09)  H  10/31/18  06:30    














Assessment/Plan





Problem List





- Problems


(1) Acute biliary pancreatitis without infection or necrosis


Code(s): K85.10 - BILIARY ACUTE PANCREATITIS WITHOUT NECROSIS OR INFECTION   





(2) Calculus of gallbladder and bile duct w/o cholecystitis or obstruction


Code(s): K80.70 - CALCULUS OF GB AND BILE DUCT W/O CHOLECYST W/O OBSTRUCTION   





(3) Disease of digestive system complicating puerperium


Code(s): O99.63 - DISEASES OF THE DIGESTIVE SYSTEM COMPLICATING THE PUERPERIUM 

  





(4) Epigastric abdominal pain


Code(s): R10.13 - EPIGASTRIC PAIN   





(5) Nausea and vomiting


Code(s): R11.2 - NAUSEA WITH VOMITING, UNSPECIFIED   


Qualifiers: 


   Vomiting type: unspecified   Vomiting Intractability: non-intractable   

Qualified Code(s): R11.2 - Nausea with vomiting, unspecified   





fever





leukocytosis





gm negative bacteremia





patient has cleared her bacteremia,wbc trending down











plan


will stop all abx tomorrow


rest as per the team


patient improving

## 2018-11-06 VITALS — DIASTOLIC BLOOD PRESSURE: 79 MMHG | SYSTOLIC BLOOD PRESSURE: 129 MMHG | TEMPERATURE: 98.2 F | HEART RATE: 56 BPM

## 2018-11-06 LAB
ALBUMIN SERPL-MCNC: 2.7 G/DL (ref 3.4–5)
ALP SERPL-CCNC: 80 U/L (ref 45–117)
ALT SERPL-CCNC: 18 U/L (ref 13–61)
ANION GAP SERPL CALC-SCNC: 9 MMOL/L (ref 8–16)
AST SERPL-CCNC: 13 U/L (ref 15–37)
BILIRUB SERPL-MCNC: 0.3 MG/DL (ref 0.2–1)
BUN SERPL-MCNC: 6 MG/DL (ref 7–18)
CALCIUM SERPL-MCNC: 8.6 MG/DL (ref 8.5–10.1)
CHLORIDE SERPL-SCNC: 105 MMOL/L (ref 98–107)
CO2 SERPL-SCNC: 25 MMOL/L (ref 21–32)
CREAT SERPL-MCNC: 0.5 MG/DL (ref 0.55–1.3)
DEPRECATED RDW RBC AUTO: 13.6 % (ref 11.6–15.6)
GLUCOSE SERPL-MCNC: 76 MG/DL (ref 74–106)
HCT VFR BLD CALC: 31.8 % (ref 32.4–45.2)
HGB BLD-MCNC: 10.3 GM/DL (ref 10.7–15.3)
MAGNESIUM SERPL-MCNC: 2.1 MG/DL (ref 1.8–2.4)
MCH RBC QN AUTO: 29.7 PG (ref 25.7–33.7)
MCHC RBC AUTO-ENTMCNC: 32.4 G/DL (ref 32–36)
MCV RBC: 91.8 FL (ref 80–96)
PHOSPHATE SERPL-MCNC: 3.6 MG/DL (ref 2.5–4.9)
PLATELET # BLD AUTO: 348 K/MM3 (ref 134–434)
PMV BLD: 8.3 FL (ref 7.5–11.1)
POTASSIUM SERPLBLD-SCNC: 3.9 MMOL/L (ref 3.5–5.1)
PROT SERPL-MCNC: 6.3 G/DL (ref 6.4–8.2)
RBC # BLD AUTO: 3.47 M/MM3 (ref 3.6–5.2)
SODIUM SERPL-SCNC: 138 MMOL/L (ref 136–145)
WBC # BLD AUTO: 9.4 K/MM3 (ref 4–10)

## 2018-11-06 RX ADMIN — PIPERACILLIN AND TAZOBACTAM SCH MLS/HR: 4; .5 INJECTION, POWDER, LYOPHILIZED, FOR SOLUTION INTRAVENOUS at 09:20

## 2018-11-06 RX ADMIN — PIPERACILLIN AND TAZOBACTAM SCH MLS/HR: 4; .5 INJECTION, POWDER, LYOPHILIZED, FOR SOLUTION INTRAVENOUS at 02:42

## 2018-11-06 RX ADMIN — IBUPROFEN SCH MG: 600 TABLET, FILM COATED ORAL at 09:19

## 2018-11-06 RX ADMIN — IBUPROFEN SCH MG: 600 TABLET, FILM COATED ORAL at 02:42

## 2018-11-06 RX ADMIN — ACETAMINOPHEN SCH: 325 TABLET ORAL at 01:06

## 2018-11-06 RX ADMIN — ACETAMINOPHEN SCH MG: 325 TABLET ORAL at 06:21

## 2018-11-06 NOTE — PN
Progress Note, Physician


History of Present Illness: 





stable


post op


doing well





- Current Medication List


Current Medications: 


Active Medications





Acetaminophen (Tylenol -)  650 mg PO Q6H [facility]


   Last Admin: 11/06/18 06:21 Dose:  650 mg


Lactated Ringer's (Lactated Ringers Solution)  1,000 ml in 1,000 mls @ 83 mls/

hr IV ASDIR [facility]


   Last Admin: 11/05/18 13:43 Dose:  Not Given


Piperacillin Sod/Tazobactam (Sod 4.5 gm/ Dextrose)  100 mls @ 200 mls/hr IVPB 

Q6H-IV ASHKAN


   Last Admin: 11/06/18 09:20 Dose:  200 mls/hr


Ibuprofen (Motrin -)  600 mg PO Q6H [facility]


   Last Admin: 11/06/18 09:19 Dose:  600 mg


Ondansetron HCl (Zofran Injection)  4 mg IVPUSH Q6H PRN


   PRN Reason: NAUSEA


Prenatal Multivit/Folic Acid/Iron (Prenatal Vitamins (Sjr) -)  1 tab PO DAILY 

[facility]


   Last Admin: 11/06/18 09:20 Dose:  1 tab











- Objective


Vital Signs: 


 Vital Signs











Temperature  98.2 F   11/06/18 05:53


 


Pulse Rate  56 L  11/06/18 05:53


 


Respiratory Rate  18   11/06/18 05:53


 


Blood Pressure  129/79   11/06/18 05:53


 


O2 Sat by Pulse Oximetry (%)  94 L  11/06/18 09:00











Constitutional: Yes: No Distress, Calm


Cardiovascular: Yes: Regular Rate and Rhythm


Respiratory: Yes: Regular, CTA Bilaterally


Gastrointestinal: Yes: Soft


Musculoskeletal: Yes: WNL


Extremities: Yes: WNL


Wound/Incision: Yes: Clean/Dry


Neurological: Yes: Alert, Oriented


Psychiatric: Yes: Alert, Oriented


Labs: 


 CBC, BMP





 11/06/18 06:40 





 11/06/18 06:40 





 INR, PTT











INR  1.21  (0.83-1.09)  H  10/31/18  06:30    














Assessment/Plan





Problem List





- Problems


(1) Acute biliary pancreatitis without infection or necrosis


Code(s): K85.10 - BILIARY ACUTE PANCREATITIS WITHOUT NECROSIS OR INFECTION   





(2) Calculus of gallbladder and bile duct w/o cholecystitis or obstruction


Code(s): K80.70 - CALCULUS OF GB AND BILE DUCT W/O CHOLECYST W/O OBSTRUCTION   





(3) Disease of digestive system complicating puerperium


Code(s): O99.63 - DISEASES OF THE DIGESTIVE SYSTEM COMPLICATING THE PUERPERIUM 

  





(4) Epigastric abdominal pain


Code(s): R10.13 - EPIGASTRIC PAIN   





(5) Nausea and vomiting


Code(s): R11.2 - NAUSEA WITH VOMITING, UNSPECIFIED   


Qualifiers: 


   Vomiting type: unspecified   Vomiting Intractability: non-intractable   

Qualified Code(s): R11.2 - Nausea with vomiting, unspecified   





fever





leukocytosis





gm negative bacteremia





patient has cleared her bacteremia,wbc trending down











plan


stop all abx


rest as per the team

## 2018-11-06 NOTE — PN
Physical Exam: 


SUBJECTIVE: Patient seen and examined. No Acute events overnight. Pt. denies 

any complaints at this time.








OBJECTIVE:





 Vital Signs











 Period  Temp  Pulse  Resp  BP Sys/Bob  Pulse Ox


 


 Last 24 Hr  97.6 F-98.2 F  60-75  16-18  111-148/71-84  








GENERAL: The patient is awake, alert, in no acute distress.


EYES: sclera anicteric, conjunctiva clear. No ptosis. 


ENT: Ears normal, nares patent, oropharynx clear without exudates, moist mucous 

membranes.


NECK: Trachea midline, full range of motion, supple. 


LUNGS: Breath sounds equal, clear to auscultation bilaterally, no wheezes, no 

crackles, no accessory muscle use. 


HEART: Regular rate and rhythm, S1, S2 without murmur, rub or gallop.


ABDOMEN: Soft, nontender, nondistended, normoactive bowel sounds, no guarding, 

no rebound, tympanic to percussion. 


EXTREMITIES: 2+ dorsal pedal pulses, warm, no calf tenderness, well-perfused, 

trace edema. 


NEUROLOGICAL: Normal speech, gait not observed.


PSYCH: Normal mood, normal affect.


SKIN: Warm, dry, normal turgor











 Laboratory Results - last 24 hr











  11/05/18





  07:30


 


WBC  8.9


 


RBC  3.74


 


Hgb  11.6


 


Hct  34.2


 


MCV  91.4


 


MCH  31.1


 


MCHC  34.0


 


RDW  14.0


 


Plt Count  332  D


 


MPV  8.6


 


Absolute Neuts (auto)  6.7


 


Neutrophils %  74.3


 


Lymphocytes %  10.3


 


Monocytes %  10.0


 


Eosinophils %  4.7 H D


 


Basophils %  0.7


 


Nucleated RBC %  0








Active Medications





A 28 y. o. Maori speaking F with abdominal pain and vomiting, admitted for 

pancreatitis 2/2 biliary sludge.  





#Gastroenterology


-Acute abdominal pain 2/2 acute pancreatitis 2/2 inspissated biliary sludge 


No vomiting since admission. 


MRCP: Cholelithiasis w/ cholecystitis, irregular gallbladder dome w/ septations

, cannot r/o gangrenous gallbladder, no stones in CBD, no pancreaticobiliary 

ductal dilatation


Lipase trending down, WBC trending down


c/w trending CMPs Q12H for LFTs, LFTs trending down indicating passed stone. 


If LFTs rise suddenly CALL Surgery, as this is medical emergency. Per Surgery, 

will wait until Gallbladder cools down, with CCY tentatively planned for 

Monday.  


c/w NPO 


c/w IVF


Pain control with non-narcotics first line. 


GI consult(Dr. Briscoe) appreciated, if pain or WBCs continue to rise, would 

reccommend imaging w/ contrast 


Bilirubin unremarkable 


f/u Rpt. BCx. 


-Nausea-resolved


c/w Zofran 4mg PRN





#Infectious Disease


-Bacteremia


BCx. Positive for Klebsiella sensitive to Zosyn


f/u repeat BCx.s


c/w Zosyn





#FEN 


-Switched to D5-LR @ 150ml/hr b/c Pt. is NPO 


-monitor electrolytes, replete if needed 


-NPO 





#Ppx.


DVT. 


-SCDs


-Early Ambulation








ASSESSMENT/PLAN:








Visit type





- Emergency Visit


Emergency Visit: Yes


ED Registration Date: 10/30/18


Care time: The patient presented to the Emergency Department on the above date 

and was hospitalized for further evaluation of their emergent condition.





- New Patient


This patient is new to me today: No





- Critical Care


Critical Care patient: No





- Discharge Referral


Referred to [facility] No

## 2018-11-06 NOTE — DS
Physical Exam: 


SUBJECTIVE: Patient seen and examined. Pt. had no acute events overnight. Pt. 

is passing urine and endorses 1 small loose BM overnight. Pt. denies pain at 

this time. 








OBJECTIVE:





 Vital Signs











 Period  Temp  Pulse  Resp  BP Sys/Bob  Pulse Ox


 


 Last 24 Hr  97.6 F-98.2 F  56-75  16-18  111-148/71-84  








PHYSICAL EXAM





GENERAL: The patient is awake, alert, and fully oriented, in no acute distress.


EYES: sclera anicteric, conjunctiva clear. 


ENT: Ears normal, nares patent, oropharynx clear without exudates, moist mucous 

membranes.


LUNGS: Breath sounds equal, clear to auscultation bilaterally, no wheezes, no 

crackles, no accessory muscle use. 


HEART: Regular rate and rhythm, S1, S2 without murmur


ABDOMEN: Soft, nontender, nondistended, normoactive bowel sounds, no guarding, 

no rebound, no erythema


EXTREMITIES: 2+ dorsal pedal pulses, no calf tenderness, warm, well-perfused, 

no edema. 


NEUROLOGICAL: Normal speech, gait not observed.


PSYCH: Normal mood, normal affect.


SKIN: Warm, dry, normal turgor, c/d/i- wound dressings





LABS


 Laboratory Results - last 24 hr











  11/06/18





  06:40


 


WBC  9.4


 


RBC  3.47 L


 


Hgb  10.3 L


 


Hct  31.8 L


 


MCV  91.8


 


MCH  29.7


 


MCHC  32.4


 


RDW  13.6


 


Plt Count  348


 


MPV  8.3











HOSPITAL COURSE:





Date of Admission:10/30/18





Date of Discharge: 11/06/18





Pt. admitted for abdominal pain 2/2 pancreatitis. MRCP showed cholelithiasis w/ 

cholecystitis, irregular gallbladder dome w/ septations but no CBD dilatation 

or obstructing calculus. Pt. received cholecystectomy. Pt. treated with empiric 

antibiotics and IV fluids. Consults with Gastroenterology, Infectious Disease 

and Surgery appreciated. Post-op management per surgery as mentioned below. 

Hospital course discussed and agreed upon with Pt., family and medical staff.


    





Minutes to complete discharge: 32





Discharge Summary


Reason For Visit: PANCREATITIS/CHOLELITHIASIS


Current Active Problems





Acute biliary pancreatitis without infection or necrosis (Acute)


Bacteremia due to Gram-negative bacteria (Acute)


Calculus of gallbladder and bile duct w/o cholecystitis or obstruction (Acute)


Disease of digestive system complicating puerperium (Acute)


Epigastric abdominal pain (Acute)


Nausea and vomiting (Acute)








Condition: Good





- Instructions


Diet, Activity, Other Instructions: 


Remember to "pump and dump" breast milk until Wednesday night/Thursday morning, 

then you may resume breast-feeding if you choose.


(Wait at least 48 hours after last medicine that baby cannot have, like 

antibiotics or narcotics.)





Postoperative instructions: You had a laparoscopic cholecystectomy on 11/5/18 

by Dr. Cesar Odom of [facility].


 


Activity: Resume your usual activities gradually, but no heavy exertion or 

lifting more than 10-15 pounds for 1 month. Remove dressings 48 hours (2 days) 

after surgery; sticky tapes underneath will fall off by themselves. You may 

shower daily starting then, just pat the incision areas dry. No bath or 

swimming until skin incisions have healed. Eat lightly at first, but advance to 

your usual diet as tolerated.


 


Pain: For pain, you may use and alternate Tylenol (acetaminophen) 1-2 pills and/

or ibuprofen 200 mg (1-3 pills) every 6 hours each as needed; this means that 

you can take one OR the other at 3-hour intervals. Do not take more than 4000mg 

of acetaminophen in a day. Take medications as prescribed or indicated on the 

labeling.


 


Follow-up: Call Dr. Odom's office at [phone] to make your postop 

appointment (Wednesday in approximately 2 weeks after surgery). Clinic is held 

in the [facility] on the first floor of [facility].


 


Call the office if you have:


* increasing pain not responsive to pain medication


* fever of 101F or higher


* vomiting


* unusual or increasing bleeding or drainage from wounds


* increasing redness or swelling at wound sites





Please see your Primary Care Physician within 1-2 weeks. Please call [phone], to make an appointment with a Primary Care Physician if you do not have 

one.





Please return to the ER if you are experiencing increasing abdominal pain, fever

, chills, constipation, or nausea and vomiting that won't stop. 





low fat diet 


Referrals: 


Cesar Odom MD [Staff Physician] - 2 Weeks (you MUST CALL office for 

appointment and time)


Disposition: HOME





- Home Medications


Comprehensive Discharge Medication List: 


Ambulatory Orders





Pnv No.95/Ferrous Fum/Folic AC [Prenatal Multivitamin Tablet] 1 each PO DAILY 10

/30/18 








This patient is new to me today: No


Emergency Visit: Yes


ED Registration Date: 10/30/18


Care time: The patient presented to the Emergency Department on the above date 

and was hospitalized for further evaluation of their emergent condition.


Critical Care patient: No





- Discharge Referral


Referred to [facility] No

## 2018-11-06 NOTE — PN
Teaching Attending Note


Name of Resident: Kannan Barboza





ATTENDING PHYSICIAN STATEMENT





I saw and evaluated the patient.


I reviewed the resident's note and discussed the case with the resident.


I agree with the resident's findings and plan as documented.








SUBJECTIVE:


 phone used. 


has no pain, or fever , no N/V . tolerated diet 





OBJECTIVE:





NAD


Cv: RRR, no MRG 


Lungs: CTAB.


ext: no edema 


Abd: soft ND, no TTP . nl BS 








ASSESSMENT AND PLAN:





27 y/o lady with no significant PMH who presented with abd pain and was found 

to have acute pancreatitis and acute cholecystitis 





1- Acute gall stone pancreatitis and cholecystitis . with Klebsiella bacteremia 


- s/p CCY yesterday 


- doing well . 


 no more need for Abx 


- repeat Blood cx NGTD


- low fat diet 


- f/u with sx after dc .


she has PCP , but does not remember name. she will follow with in 1 week 


low fat diet. she understands





Dc home

## 2018-11-06 NOTE — PN
Progress Note, Physician


History of Present Illness: 


s/p lap barbara yesterday, feeling well. Seen and examined up in room. Ambulating

, voiding. Tolerating diet, pain controlled with Tyl/Ibu alternating.





- Current Medication List


Current Medications: 


Active Medications





Acetaminophen (Tylenol -)  650 mg PO Q6H [facility]


   Last Admin: 11/06/18 06:21 Dose:  650 mg


Lactated Ringer's (Lactated Ringers Solution)  1,000 ml in 1,000 mls @ 83 mls/

hr IV ASDIR [facility]


   Last Admin: 11/05/18 13:43 Dose:  Not Given


Piperacillin Sod/Tazobactam (Sod 4.5 gm/ Dextrose)  100 mls @ 200 mls/hr IVPB 

Q6H-IV [facility]


   Last Admin: 11/06/18 02:42 Dose:  200 mls/hr


Ibuprofen (Motrin -)  600 mg PO Q6H [facility]


   Last Admin: 11/06/18 02:42 Dose:  600 mg


Ondansetron HCl (Zofran Injection)  4 mg IVPUSH Q6H PRN


   PRN Reason: NAUSEA


Prenatal Multivit/Folic Acid/Iron (Prenatal Vitamins (Sjr) -)  1 tab PO DAILY 

[facility]











- Objective


Vital Signs: 


 Vital Signs











Temperature  98.2 F   11/06/18 05:53


 


Pulse Rate  56 L  11/06/18 05:53


 


Respiratory Rate  18   11/06/18 05:53


 


Blood Pressure  129/79   11/06/18 05:53


 


O2 Sat by Pulse Oximetry (%)  96   11/05/18 14:36











Constitutional: Yes: Well Nourished, No Distress, Calm


Eyes: Yes: Conjunctiva Clear, EOM Intact.  No: Sclera Icterus


HENT: Yes: Atraumatic, Normocephalic


Gastrointestinal: Yes: Soft, Distention (minimal), Tenderness (minimal 

umbilical incisional tenderness only).  No: Tenderness, Rebound


Musculoskeletal: No: Joint Stiffness, Joint Swelling


Extremities: No: Cool, Cyanosis


Integumentary: Yes: Incision (x4 dressed).  No: Jaundice, Rash


Wound/Incision: Yes: Steri Strips (under dressings), Dressing Dry and Intact (x4

, few with scant serous dried staining).  No: Dressing Removed


Neurological: Yes: Alert, Oriented.  No: Unsteady Gait


Labs: 


 CBC, BMP





 11/06/18 06:40 





 11/06/18 06:40 





 





Problem List





- Problems


(1) Acute biliary pancreatitis without infection or necrosis


Assessment/Plan: 


POD1 s/p laparoscopic cholecystectomy for gallstone pancreatitis with 

bacteremia (resolved)


doing well


ambulating, voiding, tolerating diet, pain controlled with nonnarcotics po


incisions dressed, c/d/i


ready for d/c home with f/u in 2 weeks


to pump and dump if breast milk returns until Thursday (or at least first milk)

, then may resume breastfeeding if able


instructions in d/c plan





Code(s): K85.10 - BILIARY ACUTE PANCREATITIS WITHOUT NECROSIS OR INFECTION   





(2) Bacteremia due to Gram-negative bacteria


Assessment/Plan: 


Klebsiella, sens to Zosyn


finished abx, gallbladder out


no need for home abx


f/u cx negative


Code(s): R78.81 - BACTEREMIA   





(3) Calculus of gallbladder and bile duct w/o cholecystitis or obstruction


Code(s): K80.70 - CALCULUS OF GB AND BILE DUCT W/O CHOLECYST W/O OBSTRUCTION   





(4) Disease of digestive system complicating puerperium


Assessment/Plan: 


1 month postpartum


was breastfeeding - will need to pump & dump until 48 hours after last 

contraindicated medication intake


per pt/, she is not lactating anymore


explained milk may come back after she gets home with baby, but she will need 

to wait until Thursday to resume


Code(s): O99.63 - DISEASES OF THE DIGESTIVE SYSTEM COMPLICATING THE PUERPERIUM 

  





(5) Epigastric abdominal pain


Code(s): R10.13 - EPIGASTRIC PAIN   





(6) Nausea and vomiting


Code(s): R11.2 - NAUSEA WITH VOMITING, UNSPECIFIED   


Qualifiers: 


   Vomiting type: unspecified   Vomiting Intractability: non-intractable   

Qualified Code(s): R11.2 - Nausea with vomiting, unspecified

## 2018-11-08 NOTE — PATH
Surgical Pathology Report



Patient Name:  PAYTON OSWALD

Accession #:  [id number]

[facility]. Rec. #:  [id number]                                                        

   /Age/Gender:  1990 (Age: 28) / F

Account:  [id number]                                                          

             Location: [address] [place] [address]/[facility]

Taken:  2018

Received:  2018

Reported:  2018

Physicians:  SPENCER Haney M.D.

  



Specimen(s) Received

 GALLBLADDER 





Clinical History

Gallstone pancreatitis, bacteremia







Final Diagnosis

GALLBLADDER, CHOLECYSTECTOMY:

CHRONIC CHOLECYSTITIS.

CHOLELITHIASIS.





***Electronically Signed***

Garrett South M.D.





Gross Description

Received in formalin, labeled "gallbladder," is an 8.0 x 3.4 x 3.0 cm.

gallbladder with a 0.2 cm. in length portion of cystic duct attached. The outer

surface is tan grey and varies from smooth to shaggy. The lumen contains green,

tenacious bile as well as abundant yellow, spherical to fragmented choleliths

ranging from 0.1-0.4 cm in greatest dimension. The mucosa is green and velvety

with focal erosion. The wall of the gallbladder averages 0.1 cm. in thickness.

Representative sections are submitted in one cassette.

## 2019-08-14 ENCOUNTER — HOSPITAL ENCOUNTER (EMERGENCY)
Dept: HOSPITAL 74 - JERFT | Age: 29
Discharge: HOME | End: 2019-08-14
Payer: COMMERCIAL

## 2019-08-14 VITALS — DIASTOLIC BLOOD PRESSURE: 74 MMHG | TEMPERATURE: 98.2 F | HEART RATE: 82 BPM | SYSTOLIC BLOOD PRESSURE: 111 MMHG

## 2019-08-14 VITALS — BODY MASS INDEX: 30.6 KG/M2

## 2019-08-14 DIAGNOSIS — R21: Primary | ICD-10-CM

## 2019-08-14 NOTE — PDOC
Rapid Medical Evaluation


Time Seen by Provider: 08/14/19 15:03


Medical Evaluation: 


 Allergies











Allergy/AdvReac Type Severity Reaction Status Date / Time


 


No Known Allergies Allergy   Verified 10/30/18 16:22











08/14/19 15:03


HPI: Rash x 5 days





PE: Raised wheels B LE





ORDERS: Decadron





**Discharge Disposition





- Diagnosis


 Allergic rash present on examination








- Referrals





- Patient Instructions





- Post Discharge Activity

## 2019-08-14 NOTE — PDOC
History of Present Illness





- General


Chief Complaint: Rash


Stated Complaint: RT LEG RASH / SWELLING


Time Seen by Provider: 08/14/19 15:03


History Source: Patient





- History of Present Illness


Timing/Duration: reports: other


Location: reports: extremities





Past History





- Past Medical History


Allergies/Adverse Reactions: 


 Allergies











Allergy/AdvReac Type Severity Reaction Status Date / Time


 


No Known Allergies Allergy   Verified 08/14/19 15:33











Home Medications: 


Ambulatory Orders





Pnv No.95/Ferrous Fum/Folic AC [Prenatal Multivitamin Tablet] 1 each PO DAILY 10

/30/18 


Acetaminophen [Tylenol .Regular Strength -] 650 mg PO Q6H  tablet 11/06/18 


Ibuprofen [Motrin -] 600 mg PO Q6H  tablet 11/06/18 


Cephalexin [Keflex] 500 mg PO BID #14 capsule 08/14/19 


Loratadine [Claritin] 10 mg PO DAILY #14 tablet 08/14/19 


Prednisone [Deltasone] 40 mg PO DAILY #8 tablet 08/14/19 








COPD: No





- Immunization History


Immunization Up to Date: Yes





- Suicide/Smoking/Psychosocial Hx


Smoking History: Unknown if ever smoked


Have you smoked in the past 12 months: No


Information on smoking cessation initiated: No


Hx Alcohol Use: No


Drug/Substance Use Hx: No


Substance Use Type: None


Hx Substance Use Treatment: No





**Review of Systems





- Review of Systems


Constitutional: No: Chills, Fever


Integumentary: Yes: Pruritus, Rash





*Physical Exam





- Vital Signs


 Last Vital Signs











Temp Pulse Resp BP Pulse Ox


 


 98.2 F   82   16   111/74   99 


 


 08/14/19 15:30  08/14/19 15:30  08/14/19 15:30  08/14/19 15:30  08/14/19 15:30














- Physical Exam


General Appearance: Yes: Appropriately Dressed.  No: Apparent Distress


HEENT: positive: Normal Voice


Neck: positive: Supple


Respiratory/Chest: negative: Respiratory Distress


Integumentary: positive: Dry, Warm, Rash (8x4 cm nonblanchable, erythematous 

plaque to R popliteal fossa w/ sig ttp and ? incr warmth, no blisters, crepitus

, no joint swelling, FROMI, able to bear weight, b/l anterior proximal thigh w/ 

faint eyythema)


Neurologic: positive: Fully Oriented, Alert, Normal Mood/Affect





ED Treatment Course





- Medications


Given in the ED: 


ED Medications














Discontinued Medications














Generic Name Dose Route Start Last Admin





  Trade Name Sandy  PRN Reason Stop Dose Admin


 


Dexamethasone  10 mg  08/14/19 15:04  08/14/19 16:08





  Decadron Liquid -  PO  08/14/19 15:05  10 mg





  ONCE ONE   Administration





     





     





     





     














Medical Decision Making





- Medical Decision Making





08/14/19 16:41


27 yo F, no sig hx, here w/ pruritic rash to b/l LEs x several days. Has been 

applying cortisone to site. No obvious inciting factors





See exam





Hives w/ ? superimposed cellulitis to R popliteal fossa


-dose of decadron here


-dc w/ oral antihistamine, pred taper and keflex


-reassessment in 2 days











*DC/Admit/Observation/Transfer


Diagnosis at time of Disposition: 


 Pruritic rash








- Discharge Dispostion


Disposition: HOME


Condition at time of disposition: Good





- Prescriptions


Prescriptions: 


Cephalexin [Keflex] 500 mg PO BID #14 capsule


Loratadine [Claritin] 10 mg PO DAILY #14 tablet


Prednisone [Deltasone] 40 mg PO DAILY #8 tablet





- Referrals


Referrals: 


Chayito Sosa [Primary Care Provider] - 





- Patient Instructions


Additional Instructions: 


mcgarry erupcin puede ser alrgica, jaspreet le hemos comenzado a dina antibiticos 

para la posible infeccin de mcgarry rodilla


[place] los medicamentos segn las indicaciones y regrese a la hilda de emergencias 

en 2 hernandez para verificar





- Post Discharge Activity